# Patient Record
Sex: FEMALE | Race: WHITE | NOT HISPANIC OR LATINO | Employment: FULL TIME | URBAN - METROPOLITAN AREA
[De-identification: names, ages, dates, MRNs, and addresses within clinical notes are randomized per-mention and may not be internally consistent; named-entity substitution may affect disease eponyms.]

---

## 2017-02-03 ENCOUNTER — ALLSCRIPTS OFFICE VISIT (OUTPATIENT)
Dept: OTHER | Facility: OTHER | Age: 22
End: 2017-02-03

## 2017-03-02 ENCOUNTER — GENERIC CONVERSION - ENCOUNTER (OUTPATIENT)
Dept: OTHER | Facility: OTHER | Age: 22
End: 2017-03-02

## 2017-05-16 ENCOUNTER — ALLSCRIPTS OFFICE VISIT (OUTPATIENT)
Dept: OTHER | Facility: OTHER | Age: 22
End: 2017-05-16

## 2018-01-09 NOTE — RESULT NOTES
Message   Recorded as Task   Date: 03/02/2017 02:12 PM, Created By: Marylene Noon   Task Name: Follow Up   Assigned To: Kaitlyn Cazares   Regarding Patient: Steven Galan, Status: Active   Comment:    Marylene Noon - 82 TUB 8413 2:12 PM     TASK CREATED  Pt not happy with pills  Wants a change  Marylene Noon - 68 Mar 2017 2:12 PM     TASK IN PROGRESS   Marylene Noon - 40 Mar 2017 2:17 PM     TASK EDITED  Pt only on pill 2 weeks - she will try to stay on it at least 3 mos  She wanted a change cause she is moodier on this pill   Marylene Noon - 98 Mar 2017 2:17 PM     TASK COMPLETED   Becky Stuart - 05 Apr 2017 1:28 PM     TASK REACTIVATED   Becky Stuart - 05 Apr 2017 1:29 PM     TASK EDITED  Pt called w same complaint and wants to stop taking these pills  Pt is @ [phone]   Maggi Rosales - 05 Apr 2017 2:00 PM     TASK IN PROGRESS   Maggi Rosales - 05 Apr 2017 2:07 PM     TASK EDITED  Patient hates the pill she is currently on  She doesn't want to wait 3 months because she just doesn't like it at all  C/o mood swings, longer cycle, and headaches  She was previously on Generess Fe but wasn't crazy about it  It was better than her current one  She is willing to re visit the [facility] unless we want to try her on something totally new  Your thoughts? Maryam Crys - 05 Apr 2017 4:08 PM     TASK EDITED  Patient states that on the pill she is on now she feels more dickerson not like herself and she's noticing more headaches discussed with her trying a multiphasic pill  She will try Ortho Tri-Cyclen Lo  Prescription was sent        Plan  Encounter for birth control pills maintenance    · Ortho Tri-Cyclen Lo 0 18/0 215/0 25 MG-25 MCG Oral Tablet (Norgestim-Eth Estrad  Triphasic); Take 1 pill daily    Signatures   Electronically signed by : Sarah Hurt CNM;  Apr 5 2017  4:08PM EST                       (Author)

## 2018-01-13 VITALS
DIASTOLIC BLOOD PRESSURE: 76 MMHG | HEIGHT: 63 IN | BODY MASS INDEX: 25.34 KG/M2 | WEIGHT: 143 LBS | SYSTOLIC BLOOD PRESSURE: 122 MMHG

## 2018-01-13 VITALS
DIASTOLIC BLOOD PRESSURE: 80 MMHG | BODY MASS INDEX: 26.75 KG/M2 | HEIGHT: 63 IN | WEIGHT: 151 LBS | SYSTOLIC BLOOD PRESSURE: 112 MMHG

## 2018-01-14 NOTE — PROGRESS NOTES
Chief Complaint  Chief Complaint Free Text Note Form: Pt is here for [facility]  She is currently on Generic Generess Fe and she is happy with this  History of Present Illness  HPI: 27year-old with an LMP of 16 here today for a refill of her birth control pills  She's taking Generess and denies ACHES  She does use condoms when she is sexually active  She has no complaints today  Contraception (Brief): She is  0  The last menstrual period began 16  She is not pregnant  Current menstrual history includes regular menstrual cycles  She is sexually active  Patient goals include pregnancy prevention  The patient is currently asymptomatic  Vitals  Vital Signs [Data Includes: Current Encounter]    Recorded: 07RPM4004 65:07IT   Systolic 143   Diastolic 74   Height 5 ft 3 in   2-20 Stature Percentile 31 %   Weight 136 lb    2-20 Weight Percentile 66 %   BMI Calculated 24 09   BMI Percentile 75 %   BSA Calculated 1 64   LMP [id number]     Physical Exam    Pulmonary   Respiratory effort: No increased work of breathing or signs of respiratory distress  Auscultation of lungs: Clear to auscultation  Cardiovascular   Auscultation of heart: Normal rate and rhythm, normal S1 and S2, no murmurs  Psychiatric   Orientation to person, place, and time: Normal     Mood and affect: Normal        Assessment    1  Encounter for birth control pills maintenance (V25 41) (Z30 41)    Plan    1  Norethin-Eth Estradiol-Fe 0 8-25 MG-MCG Oral Tablet Chewable (Generess FE); Take 1 tablet daily    Discussion/Summary  Discussion Summary:   We'll refill the birth control pills for the patient  She declined STD testing at this time but advised  She changes her mind to let us know  Return to the office in 1 year or when necessary  Contraception: Impression: contraception management  Currently, the patient has satisfactory contraception  There are no changes in medication management  Treatment plan includes condom use  Patient discussion: 20 minute visit, greater than half of the time was spent on counseling        Future Appointments    Date/Time Provider Specialty Site   01/26/2017 10:00 AM Odilia Lr CNM Obstetrics/Gynecology [facility] [address] [facility]     Signatures   Electronically signed by : Sanford Hamilton CNM; Jan 25 2016  2:44PM EST                       (Author)    Electronically signed by : Sanford Hamilton CNM; Jan 25 2016  2:44PM EST                       (Author)    Electronically signed by : Umer Florez MD; Jan 26 2016  1:34PM EST Mixed hyperlipidemia

## 2018-01-15 NOTE — MISCELLANEOUS
Message   Recorded as Task   Date: 05/23/2016 02:18 PM, Created By: Glenny Gu   Task Name: Medical Complaint Callback   Assigned To: Imelda Saavedra   Regarding Patient: Maureen Chery, Status: In Progress   Comment:    Jerica Linton - 23 May 2016 2:18 PM     TASK CREATED  Caller: Self; [phone] (Home)  extremely dickerson, with anger, being on the generic of generess fe, wants brand necessary, please sent to the [facility] pharmacy on file in [place], to last till her yrly exam next yr  [address] - 23 May 2016 2:39 PM     TASK [address],[facility]  [address] - 23 May 2016 2:41 PM     TASK EDITED  Rx for brand generess to ehr        Active Problems    1  Encounter for birth control pills maintenance (V25 41) (Z30 41)    Current Meds   1  Norethin-Eth Estradiol-Fe 0 8-25 MG-MCG Oral Tablet Chewable (Generess FE); Take 1   tablet daily; Therapy: 07LLO7069 to (Evaluate:35Squ8544)  Requested for: 01JQI8264; Last   Rx:25Jan2016 Ordered    Plan  Encounter for birth control pills maintenance    · Generess FE 0 8-25 MG-MCG Oral Tablet Chewable (Norethin-Eth Estradiol-Fe);  sig 1 daily    Signatures   Electronically signed by :  Didier Mcintyre, ; May 23 2016  2:41PM EST                       (Author)

## 2018-01-16 NOTE — MISCELLANEOUS
Message   Recorded as Task   Date: 02/27/2017 01:58 PM, Created By: Alexis Greenberg   Task Name: Medical Complaint Callback   Assigned To: Evans Amanda   Regarding Patient: More Woodruff, Status: In Progress   Comment:    Ximena Dillard - 27 Feb 2017 1:58 PM     TASK CREATED  Caller: Self; Medical Complaint; [phone] (Day)  pt recently changed pills; she has been on the current one for a month; does not like it-feels extra dickerson  Had been told there were 2 options and she wants to try the other one now  she is @[phone]  Heladio Brown - 27 Feb 2017 3:37 PM     TASK EDITED  lmtcb   Heladio Brown - 27 Feb 2017 3:37 PM     TASK IN PROGRESS   Mellisa George - 28 Feb 2017 2:33 PM     TASK EDITED  pt returning call on prev task, pts # [phone]   [facility] - 28 Feb 2017 2:50 PM     TASK EDITED  lm for pt tcb   Heladio Brown - 44 Mar 2017 11:56 AM     TASK EDITED  As pt has not cb - will delete        Active Problems    1  Encounter for birth control pills maintenance (V25 41) (Z30 41)   2  Encounter for gynecological examination without abnormal finding (V72 31) (Z01 419)    Current Meds   1  Generess FE 0 8-25 MG-MCG Oral Tablet Chewable (Norethin-Eth Estradiol-Fe); CHEW   1 TABLET BY MOUTH DAILY; Therapy: 85SCK4016 to (Evaluate:16Oct2017)  Requested for: 33YIL1734; Last   Rx:83Rvp9830 Ordered   2  Loestrin Fe 1/20 1-20 MG-MCG Oral Tablet (Norethin Ace-Eth Estrad-FE); TAKE ONE (1)   TABLET daily; Therapy: 36BVP2530 to (Last Rx:15Ayy8108)  Requested for: 07Amf9519 Ordered   3  Norethin-Eth Estradiol-Fe 0 8-25 MG-MCG Oral Tablet Chewable (Generess FE); Take 1   tablet daily; Therapy: 28MOD5372 to (Evaluate:60Cmz0750)  Requested for: 92ZKW8508; Last   Rx:25Jan2016 Ordered    Signatures   Electronically signed by :  Marquis Mcintyre, ; Mar  2 2017 11:57AM EST                       (Author)

## 2018-02-23 ENCOUNTER — OFFICE VISIT (OUTPATIENT)
Dept: OBGYN CLINIC | Facility: MEDICAL CENTER | Age: 23
End: 2018-02-23
Payer: COMMERCIAL

## 2018-02-23 VITALS
WEIGHT: 156.4 LBS | BODY MASS INDEX: 27.71 KG/M2 | SYSTOLIC BLOOD PRESSURE: 120 MMHG | DIASTOLIC BLOOD PRESSURE: 80 MMHG | HEIGHT: 63 IN

## 2018-02-23 DIAGNOSIS — Z30.41 SURVEILLANCE OF CONTRACEPTIVE PILL: ICD-10-CM

## 2018-02-23 DIAGNOSIS — Z01.419 ENCOUNTER FOR ANNUAL ROUTINE GYNECOLOGICAL EXAMINATION: Primary | ICD-10-CM

## 2018-02-23 PROBLEM — F41.9 ANXIETY: Status: ACTIVE | Noted: 2018-02-23

## 2018-02-23 PROCEDURE — S0612 ANNUAL GYNECOLOGICAL EXAMINA: HCPCS | Performed by: OBSTETRICS & GYNECOLOGY

## 2018-02-23 PROCEDURE — 87491 CHLMYD TRACH DNA AMP PROBE: CPT | Performed by: OBSTETRICS & GYNECOLOGY

## 2018-02-23 PROCEDURE — 87591 N.GONORRHOEAE DNA AMP PROB: CPT | Performed by: OBSTETRICS & GYNECOLOGY

## 2018-02-23 PROCEDURE — G0145 SCR C/V CYTO,THINLAYER,RESCR: HCPCS | Performed by: OBSTETRICS & GYNECOLOGY

## 2018-02-23 PROCEDURE — 88141 CYTOPATH C/V INTERPRET: CPT | Performed by: PATHOLOGY

## 2018-02-23 RX ORDER — NORGESTIMATE AND ETHINYL ESTRADIOL
1 KIT DAILY
Refills: 12 | COMMUNITY
Start: 2018-02-09 | End: 2018-02-23

## 2018-02-23 RX ORDER — NORGESTIMATE AND ETHINYL ESTRADIOL 7DAYSX3 LO
1 KIT ORAL DAILY
Qty: 84 TABLET | Refills: 3 | Status: SHIPPED | OUTPATIENT
Start: 2018-02-23 | End: 2019-05-05 | Stop reason: SDUPTHER

## 2018-02-23 NOTE — PROGRESS NOTES
Assessment/Plan:    No problem-specific Assessment & Plan notes found for this encounter  Diagnoses and all orders for this visit:    Encounter for annual routine gynecological examination  -     Liquid-based pap, screening    Surveillance of contraceptive pill  -     norgestimate-ethinyl estradiol (ORTHO TRI-CYCLEN LO) 0 18/0 215/0 25 MG-25 MCG per tablet; Take 1 tablet by mouth daily    Other orders  -     Discontinue: TRI-LO-DARLENE 0 18/0 215/0 25 MG-25 MCG per tablet; Take 1 tablet by mouth daily        Subjective:      Patient ID: Jostin Gómez is a 26 y o  female  Working at [address] [place] [address] in [employer], going to school for business administration  Boyfriend x 1 5 years, agreeable to cultures  3 lifetime partner  Not using condoms consistently  On OCPS - would like brand refill, as finds she gets less headaches with brand name  Denies urinary complaints  No changes in her bowel habits  No breast concerns today  This will be her 1st gynecologic exam     Her & her  boyfriend are going to [place] next weekend for the annual horse show, she does ride horses  Gynecologic Exam   The patient's pertinent negatives include no missed menses, pelvic pain, vaginal bleeding or vaginal discharge  Pertinent negatives include no abdominal pain, constipation, diarrhea, dysuria, frequency or painful intercourse  She is sexually active  She uses condoms and oral contraceptives for contraception  Her menstrual history has been regular  The following portions of the patient's history were reviewed and updated as appropriate: allergies, current medications, past family history, past medical history, past social history, past surgical history and problem list     Review of Systems   Constitutional: Negative for activity change and unexpected weight change  Gastrointestinal: Negative for abdominal distention, abdominal pain, constipation and diarrhea     Genitourinary: Negative for dyspareunia, dysuria, frequency, menstrual problem, missed menses, pelvic pain, vaginal bleeding, vaginal discharge and vaginal pain  Objective:      /80   Ht 5' 2 5" (1 588 m)   Wt 70 9 kg (156 lb 6 4 oz)   LMP 02/07/2018   BMI 28 15 kg/m²          Physical Exam   Constitutional: She appears well-developed and well-nourished  No distress  Pulmonary/Chest: No respiratory distress  Abdominal: Soft  There is no tenderness  Genitourinary: Vagina normal and uterus normal  No breast swelling, tenderness or discharge  There is no rash on the right labia  There is no rash or lesion on the left labia  Uterus is not enlarged and not tender  Cervix exhibits no motion tenderness and no discharge  Right adnexum displays no mass and no tenderness  Left adnexum displays no mass and no tenderness  No vaginal discharge found  Musculoskeletal: She exhibits no edema  Neurological: She is alert  Skin: Skin is warm and dry  Psychiatric: She has a normal mood and affect  Vitals reviewed

## 2018-02-27 LAB
CHLAMYDIA DNA CVX QL NAA+PROBE: NORMAL
N GONORRHOEA DNA GENITAL QL NAA+PROBE: NORMAL

## 2018-03-02 LAB
LAB AP GYN PRIMARY INTERPRETATION: NORMAL
Lab: NORMAL
PATH INTERP SPEC-IMP: NORMAL

## 2018-03-09 ENCOUNTER — APPOINTMENT (OUTPATIENT)
Dept: LAB | Age: 23
End: 2018-03-09
Payer: COMMERCIAL

## 2018-03-09 ENCOUNTER — TRANSCRIBE ORDERS (OUTPATIENT)
Dept: ADMINISTRATIVE | Age: 23
End: 2018-03-09

## 2018-03-09 DIAGNOSIS — N30.00 ACUTE CYSTITIS WITHOUT HEMATURIA: ICD-10-CM

## 2018-03-09 DIAGNOSIS — N30.00 ACUTE CYSTITIS WITHOUT HEMATURIA: Primary | ICD-10-CM

## 2018-03-09 LAB
BACTERIA UR QL AUTO: ABNORMAL /HPF
BILIRUB UR QL STRIP: NEGATIVE
CLARITY UR: CLEAR
COLOR UR: YELLOW
GLUCOSE UR STRIP-MCNC: NEGATIVE MG/DL
HGB UR QL STRIP.AUTO: ABNORMAL
HYALINE CASTS #/AREA URNS LPF: ABNORMAL /LPF
KETONES UR STRIP-MCNC: NEGATIVE MG/DL
LEUKOCYTE ESTERASE UR QL STRIP: ABNORMAL
NITRITE UR QL STRIP: NEGATIVE
NON-SQ EPI CELLS URNS QL MICRO: ABNORMAL /HPF
PH UR STRIP.AUTO: 6 [PH] (ref 4.5–8)
PROT UR STRIP-MCNC: NEGATIVE MG/DL
RBC #/AREA URNS AUTO: ABNORMAL /HPF
SP GR UR STRIP.AUTO: 1.02 (ref 1–1.03)
UROBILINOGEN UR QL STRIP.AUTO: 0.2 E.U./DL
WBC #/AREA URNS AUTO: ABNORMAL /HPF

## 2018-03-09 PROCEDURE — 87186 SC STD MICRODIL/AGAR DIL: CPT

## 2018-03-09 PROCEDURE — 87086 URINE CULTURE/COLONY COUNT: CPT

## 2018-03-09 PROCEDURE — 81001 URINALYSIS AUTO W/SCOPE: CPT

## 2018-03-09 PROCEDURE — 87077 CULTURE AEROBIC IDENTIFY: CPT

## 2018-03-09 RX ORDER — SULFAMETHOXAZOLE AND TRIMETHOPRIM 800; 160 MG/1; MG/1
1 TABLET ORAL EVERY 12 HOURS SCHEDULED
Qty: 6 TABLET | Refills: 0 | Status: SHIPPED | OUTPATIENT
Start: 2018-03-09 | End: 2018-03-12

## 2018-03-09 NOTE — TELEPHONE ENCOUNTER
----- Message from Isabel Sam MD sent at 3/9/2018 11:42 AM EST -----  Can you please let Joan Houser know that I reviewed her pap - it was very mildly abnormal (ASCUS) - but because of her age we don't recommend any further testing right now  We will repeat cytology next year to ensure her body clears it  Her cultures were also negative  Thanks!

## 2018-03-09 NOTE — TELEPHONE ENCOUNTER
----- Message from Noemy Rodríguez MD sent at 3/9/2018 11:42 AM EST -----  Can you please let Nettie Alcala know that I reviewed her pap - it was very mildly abnormal (ASCUS) - but because of her age we don't recommend any further testing right now  We will repeat cytology next year to ensure her body clears it  Her cultures were also negative  Thanks!

## 2018-03-09 NOTE — TELEPHONE ENCOUNTER
Called pt- advised of pap results  Pt will call to schedule repeat pap in 1 year  -also, informed of neg Gc/chlamydia   Pt states, "I have frequency & hesitancy with urination " also, had pain last evening " UA & C& S ordered to [facility] lab  Bactrim ordered to pharmacy, per [facility] protocol  Pt advised to take urine to lab prior to starting antibiotic therapy

## 2018-03-11 LAB — BACTERIA UR CULT: ABNORMAL

## 2018-03-12 ENCOUNTER — TELEPHONE (OUTPATIENT)
Dept: OBGYN CLINIC | Facility: CLINIC | Age: 23
End: 2018-03-12

## 2018-03-12 DIAGNOSIS — R35.0 FREQUENT URINATION: Primary | ICD-10-CM

## 2018-03-12 RX ORDER — NITROFURANTOIN 25; 75 MG/1; MG/1
CAPSULE ORAL
Qty: 14 CAPSULE | Refills: 0 | Status: SHIPPED | OUTPATIENT
Start: 2018-03-12 | End: 2018-09-08 | Stop reason: ALTCHOICE

## 2018-03-12 NOTE — TELEPHONE ENCOUNTER
Spoke with pt - she is aware of urine results  She took the bactrim, but only for a day 1/2  Last pill was taken Saturday  It made her sick - made her throw up  Inquired if had the stomach virus - she stated she was fine before taking the Bactrim  She wants to know if there is something else she can take  Please advise  Thanks!

## 2018-03-12 NOTE — TELEPHONE ENCOUNTER
----- Message from Sylvester Esparza MD sent at 3/11/2018  6:03 PM EDT -----  Can you let [place] know that she does in fact have a UTI - the bactrim she took should have cleared this    Thanks

## 2018-03-14 ENCOUNTER — TELEPHONE (OUTPATIENT)
Dept: OBGYN CLINIC | Facility: CLINIC | Age: 23
End: 2018-03-14

## 2018-03-14 NOTE — TELEPHONE ENCOUNTER
Called pt -L/M on voicemail asking that pt call office just to confirm she is feeling better & symptoms have subsided since taking the bactrim

## 2018-03-14 NOTE — TELEPHONE ENCOUNTER
Pt returned call- states" bactrim made her feel nauseous " she d/c'd it & macrobid was ordered  She is feeling much better & tolerating  macrobid without any difficulty  Pt encouraged to increase her water intake while taking medication, & to take all med, as prescribed  Pt verbalized understanding

## 2018-09-08 ENCOUNTER — OFFICE VISIT (OUTPATIENT)
Dept: URGENT CARE | Facility: MEDICAL CENTER | Age: 23
End: 2018-09-08
Payer: COMMERCIAL

## 2018-09-08 VITALS
HEART RATE: 102 BPM | BODY MASS INDEX: 30.91 KG/M2 | HEIGHT: 62 IN | SYSTOLIC BLOOD PRESSURE: 138 MMHG | OXYGEN SATURATION: 100 % | WEIGHT: 168 LBS | TEMPERATURE: 98.5 F | RESPIRATION RATE: 20 BRPM | DIASTOLIC BLOOD PRESSURE: 88 MMHG

## 2018-09-08 DIAGNOSIS — S06.0X0A CONCUSSION WITHOUT LOSS OF CONSCIOUSNESS, INITIAL ENCOUNTER: Primary | ICD-10-CM

## 2018-09-08 PROCEDURE — 99213 OFFICE O/P EST LOW 20 MIN: CPT | Performed by: PHYSICIAN ASSISTANT

## 2018-09-08 RX ORDER — VENLAFAXINE HYDROCHLORIDE 150 MG/1
150 CAPSULE, EXTENDED RELEASE ORAL DAILY
Refills: 5 | COMMUNITY
Start: 2018-06-07

## 2018-09-08 RX ORDER — PANTOPRAZOLE SODIUM 40 MG/1
40 TABLET, DELAYED RELEASE ORAL DAILY
COMMUNITY
End: 2020-07-02 | Stop reason: HOSPADM

## 2018-09-08 NOTE — PROGRESS NOTES
Pt fell off of her horse and hit the back of her head on the ground  Pt was wearing a helmet  Pt denies losing consciousness  Oriented x 3  Pupils dilated

## 2018-09-08 NOTE — LETTER
September 8, 2018     Patient: Nolan Donahue   YOB: 1995   Date of Visit: 9/8/2018       To Whom it May Concern:    Nolan Donahue was seen in my clinic on 9/8/2018  She may return to school on 9/10/2018  Please allow for additional time for assignments and tests until symptom free  If you have any questions or concerns, please don't hesitate to call           Sincerely,          Brittney Cantrell PA-C        CC: No Recipients

## 2018-09-08 NOTE — PROGRESS NOTES
[facility]        NAME: Rashad Haider is a 26 y o  female  : 1995    MRN: [id number]  DATE: 2018  TIME: 6:41 PM    Assessment and Plan   Concussion without loss of consciousness, initial encounter [S06 0X0A]  1  Concussion without loss of consciousness, initial encounter           Patient Instructions     1  Tylenol or Motrin as needed for headache  2  Activities as tolerated until symptom free  3  Follow up with PCP in 3-5 days if symptoms persist      Chief Complaint     Chief Complaint   Patient presents with    Head Injury         History of Present Illness       The patient is a 29-year-old female presents for evaluation of a head injury after a fall from horse earlier today  She states she was riding a horse when she was thrown off the back, striking the back of her neck and head off the ground  Patient denies any loss of consciousness or post injury vomiting  She does report headache, dizziness and slight disorientation  Patient also complains of some bruising of her left thumb and tightness in the back of her upper back and shoulder region  Review of Systems   Review of Systems   Constitutional: Negative  Musculoskeletal: Positive for neck pain  Neurological: Positive for dizziness, light-headedness and headaches  Negative for speech difficulty, weakness and numbness           Current Medications       Current Outpatient Prescriptions:     norgestimate-ethinyl estradiol (ORTHO TRI-CYCLEN LO) 0 18/0 215/0 25 MG-25 MCG per tablet, Take 1 tablet by mouth daily, Disp: 84 tablet, Rfl: 3    pantoprazole (PROTONIX) 40 mg tablet, Take 40 mg by mouth daily, Disp: , Rfl:     venlafaxine (EFFEXOR-XR) 150 mg 24 hr capsule, Take 150 mg by mouth daily, Disp: , Rfl: 5    Current Allergies     Allergies as of 2018    (No Known Allergies)            The following portions of the patient's history were reviewed and updated as appropriate: allergies, current medications, past family history, past medical history, past social history, past surgical history and problem list      History reviewed  No pertinent past medical history  Past Surgical History:   Procedure Laterality Date    FOOT TENDON SURGERY Bilateral 2012    Repair of flat feet       Family History   Problem Relation Age of Onset    No Known Problems Mother     No Known Problems Father          Medications have been verified  Objective   /88   Pulse 102   Temp 98 5 °F (36 9 °C) (Temporal)   Resp 20   Ht 5' 2" (1 575 m)   Wt 76 2 kg (168 lb)   SpO2 100%   BMI 30 73 kg/m²        Physical Exam     Physical Exam   Constitutional: She is oriented to person, place, and time  She appears well-developed and well-nourished  No distress  HENT:   Head: Normocephalic and atraumatic  Right Ear: Tympanic membrane normal    Left Ear: Tympanic membrane normal    Nose: Nose normal    Mouth/Throat: Uvula is midline, oropharynx is clear and moist and mucous membranes are normal    Eyes: Conjunctivae and EOM are normal  Pupils are equal, round, and reactive to light  Cardiovascular: Normal rate, regular rhythm and normal heart sounds  No murmur heard  Pulmonary/Chest: Effort normal and breath sounds normal    Musculoskeletal:        Back:         Hands:  Neurological: She is alert and oriented to person, place, and time  She has normal strength  No cranial nerve deficit or sensory deficit  Coordination abnormal  Gait normal    Reflex Scores:       Tricep reflexes are 2+ on the right side and 2+ on the left side  Bicep reflexes are 2+ on the right side and 2+ on the left side         Patellar reflexes are 2+ on the right side and 2+ on the left side   + Romberg

## 2018-09-08 NOTE — PATIENT INSTRUCTIONS
1  Tylenol or Motrin as needed for headache  2  Activities as tolerated until symptom free  3   Follow up with PCP in 3-5 days if symptoms persist

## 2018-09-12 ENCOUNTER — APPOINTMENT (EMERGENCY)
Dept: CT IMAGING | Facility: HOSPITAL | Age: 23
End: 2018-09-12
Payer: COMMERCIAL

## 2018-09-12 ENCOUNTER — HOSPITAL ENCOUNTER (EMERGENCY)
Facility: HOSPITAL | Age: 23
Discharge: HOME | End: 2018-09-12
Attending: EMERGENCY MEDICINE
Payer: COMMERCIAL

## 2018-09-12 VITALS
RESPIRATION RATE: 18 BRPM | OXYGEN SATURATION: 97 % | HEART RATE: 77 BPM | SYSTOLIC BLOOD PRESSURE: 133 MMHG | DIASTOLIC BLOOD PRESSURE: 79 MMHG | WEIGHT: 165 LBS | BODY MASS INDEX: 30.36 KG/M2 | TEMPERATURE: 98.4 F | HEIGHT: 62 IN

## 2018-09-12 DIAGNOSIS — R51.9 HEADACHE: ICD-10-CM

## 2018-09-12 DIAGNOSIS — R11.0 NAUSEA: ICD-10-CM

## 2018-09-12 DIAGNOSIS — S06.0X9A CONCUSSION: Primary | ICD-10-CM

## 2018-09-12 LAB — EXT PREG TEST URINE: NEGATIVE

## 2018-09-12 PROCEDURE — 99284 EMERGENCY DEPT VISIT MOD MDM: CPT

## 2018-09-12 PROCEDURE — 70450 CT HEAD/BRAIN W/O DYE: CPT

## 2018-09-12 PROCEDURE — 81025 URINE PREGNANCY TEST: CPT | Performed by: PHYSICIAN ASSISTANT

## 2018-09-12 RX ORDER — ONDANSETRON 4 MG/1
4 TABLET, FILM COATED ORAL EVERY 8 HOURS PRN
Qty: 20 TABLET | Refills: 0 | Status: SHIPPED | OUTPATIENT
Start: 2018-09-12 | End: 2020-06-23

## 2018-09-12 RX ORDER — ACETAMINOPHEN, ASPIRIN AND CAFFEINE 250; 250; 65 MG/1; MG/1; MG/1
1 TABLET, FILM COATED ORAL EVERY 6 HOURS PRN
Qty: 30 TABLET | Refills: 0 | Status: SHIPPED | OUTPATIENT
Start: 2018-09-12 | End: 2020-06-23

## 2018-09-12 NOTE — ED PROVIDER NOTES
History  Chief Complaint   Patient presents with    Fall     Pt presents for re-evaluation  Pt fell off horse several days ago and told she has a concussion  Pt states she has little to no relief from original symptoms  Es Greene is a 26 y o  female presents to the ED with complaints of intermittent frontal HA, intermittent nausea and b/l lateral neck pain x 2 days  Patient fell off a horse on Saturday and was evaluated at Urgent Care on Saturday night  Patient denies LOC  Patient states intermittent HA began a few hours after the accidents and the neck pain began upon awakening on Sunday  Patient states she began feeling intermittent nausea with eating on Monday, denies vomiting  Patient states she has been having intermittent photophobia to fluorescent lights at school  Patient has been able to concentrate at school and has been seen by PCP since incident  Patient has been taking 2 (200mg) ibuprofen with relief of HA for 4-6 hours  Patient states she has only taken ibuprofen 1-2 times since accident  Denies amnesia, confusion, LOC, current blood thinners, seizure, increased lethargy, blurred vision, chest pain, SOB  History provided by:  Patient and caregiver  Fall   Mechanism of injury: fall    Injury location:  Head/neck  Head/neck injury location:  Head  [place]    Time since incident:  4 days  Fall:     Fall occurred:  From an animal    Height of fall:  4 feet    Impact surface:  Dirt    Point of impact:  Head  Protective equipment: helmet    Suspicion of alcohol use: no    Suspicion of drug use: no    Prior to arrival data:     Bystander interventions:  None    Blood loss:  None    Responsiveness at scene:  Alert    Orientation at scene:  Person, place, situation and time    Loss of consciousness: no      Amnesic to event: no    Associated symptoms: headaches, nausea and neck pain    Associated symptoms: no abdominal pain, no back pain, no blindness, no chest pain, no difficulty breathing, no hearing loss, no loss of consciousness, no seizures and no vomiting    Headaches:     Severity:  Mild    Onset quality:  Gradual    Duration:  3 days    Timing:  Intermittent    Progression:  Unchanged    Chronicity:  New      Prior to Admission Medications   Prescriptions Last Dose Informant Patient Reported? Taking?   norgestimate-ethinyl estradiol (ORTHO TRI-CYCLEN LO) 0 18/0 215/0 25 MG-25 MCG per tablet   No No   Sig: Take 1 tablet by mouth daily   pantoprazole (PROTONIX) 40 mg tablet   Yes No   Sig: Take 40 mg by mouth daily   venlafaxine (EFFEXOR-XR) 150 mg 24 hr capsule   Yes No   Sig: Take 150 mg by mouth daily      Facility-Administered Medications: None       History reviewed  No pertinent past medical history  Past Surgical History:   Procedure Laterality Date    FOOT TENDON SURGERY Bilateral 2012    Repair of flat feet       Family History   Problem Relation Age of Onset    No Known Problems Mother     No Known Problems Father      I have reviewed and agree with the history as documented  Social History   Substance Use Topics    Smoking status: Never Smoker    Smokeless tobacco: Never Used    Alcohol use Yes        Review of Systems   Constitutional: Negative for appetite change, chills, fever and unexpected weight change  HENT: Negative for congestion, drooling, ear pain, hearing loss, rhinorrhea, sore throat, trouble swallowing and voice change  Eyes: Negative for blindness, pain, discharge, redness and visual disturbance  Respiratory: Negative for cough, shortness of breath, wheezing and stridor  Cardiovascular: Negative for chest pain, palpitations and leg swelling  Gastrointestinal: Positive for nausea  Negative for abdominal pain, blood in stool, constipation, diarrhea and vomiting  Genitourinary: Negative for dysuria, flank pain, frequency, hematuria and urgency  Musculoskeletal: Positive for myalgias and neck pain   Negative for arthralgias, back pain, gait problem, joint swelling and neck stiffness  Skin: Negative for color change and rash  Neurological: Positive for headaches  Negative for dizziness, tremors, seizures, loss of consciousness, syncope, facial asymmetry, speech difficulty, weakness, light-headedness and numbness  Physical Exam  Physical Exam   Constitutional: She is oriented to person, place, and time  She appears well-developed and well-nourished  No distress  HENT:   Head: Normocephalic and atraumatic  Right Ear: External ear normal    Left Ear: External ear normal    Nose: Nose normal    Mouth/Throat: Oropharynx is clear and moist    Eyes: Conjunctivae and EOM are normal  Pupils are equal, round, and reactive to light  Neck: Trachea normal, normal range of motion and full passive range of motion without pain  Neck supple  No spinous process tenderness and no muscular tenderness present  No edema, no erythema and normal range of motion present  No Brudzinski's sign and no Kernig's sign noted  Cardiovascular: Normal rate and regular rhythm  Pulmonary/Chest: Effort normal and breath sounds normal  No respiratory distress  She has no wheezes  She has no rales  She exhibits no tenderness  Abdominal: Soft  Bowel sounds are normal  She exhibits no distension  There is no tenderness  There is no guarding  Musculoskeletal: Normal range of motion  Neurological: She is alert and oriented to person, place, and time  She has normal strength and normal reflexes  No cranial nerve deficit or sensory deficit  She displays a negative Romberg sign  GCS eye subscore is 4  GCS verbal subscore is 5  GCS motor subscore is 6     Alert and oriented to person, place, and time  PERRL and 3 mm symmetrical, EOMI with no evidence of nystagmus  Visual fields were intact to confrontation  Visual pursuits were smooth with normal saccadic eye movements  Facial sensation intact b/l with no evidence of facial asymmetry  Hearing was normal b/l and the tongue and palate were in midline  SCM and upper trapezius strength normal b/l  Normal muscle tone, muscle strength testing revealed 5/5 for both upper and lower extremities   DTRs were 2+ both upper and lower, plantar reflex was flexor b/l   No evidence of postural or action tremor, No dysmetria seen on FTN testing   No evidence of sensory deficits    Skin: Skin is warm and dry  Capillary refill takes less than 2 seconds  Psychiatric: She has a normal mood and affect  Nursing note and vitals reviewed  Vital Signs  ED Triage Vitals [09/12/18 1439]   Temperature Pulse Respirations Blood Pressure SpO2   98 4 °F (36 9 °C) 77 18 133/79 97 %      Temp Source Heart Rate Source Patient Position - Orthostatic VS BP Location FiO2 (%)   Oral Monitor Sitting Right arm --      Pain Score       5           Vitals:    09/12/18 1439   BP: 133/79   Pulse: 77   Patient Position - Orthostatic VS: Sitting       Visual Acuity  Visual Acuity      Most Recent Value   L Pupil Size (mm)  4   R Pupil Size (mm)  4          ED Medications  Medications - No data to display    Diagnostic Studies  Results Reviewed     Procedure Component Value Units Date/Time    POCT pregnancy, urine [[id number]]  (Normal) Resulted:  09/12/18 1556    Lab Status:  Final result Updated:  09/12/18 1556     EXT PREG TEST UR (Ref: Negative) NEGATIVE                 CT head without contrast   Final Result by Milton Mullen MD (09/12 1638)      No acute intracranial abnormality  Workstation performed: [id number]                    Procedures  Procedures       Phone Contacts  ED Phone Contact    ED Course  ED Course as of Sep 12 1952   Wed Sep 12, 2018   1540 Mother would like CT Head at this time given VIVIENNE  Educated mother regarding risks of CT and evaluation of normal neurological exam, will obtain CT at this time  1652 Reviewed findings with patient and mother   Educated mother and patient on concussive symptoms and will advise close follow up with PCP/sports medicine  MDM  Number of Diagnoses or Management Options  Diagnosis management comments: Differential diagnosis included but not limited to: concussion, head injury, headache, migraine    [place] CT Rule = 0 Unnecessary for CT Head at this time    Educated mother and patient regarding concussion protocol     CritCare Time    Disposition  Final diagnoses:   Headache   Nausea   Concussion     Time reflects when diagnosis was documented in both MDM as applicable and the Disposition within this note     Time User Action Codes Description Comment    9/12/2018  3:31 PM Kade Elton Add [R51] Headache     9/12/2018  3:31 PM Luvenia Clas [R11 0] Nausea     9/12/2018  3:31 PM Kade Elton Add [S06 0X9A] Concussion     9/12/2018  3:31 PM Kade Elton Modify [R51] Headache     9/12/2018  3:31  [address] [S06 0X9A] Concussion       ED Disposition     ED Disposition Condition Comment    Discharge  Patient is being discharged home in good condition  Follow-up Information     Follow up With Specialties Details Why [address], DO Family Medicine Go to As needed [address] [facility] [address]  [phone]       [address] Sports Medicine  Schedule an appointment as soon as possible for a visit As needed [address] [facility] [address]  [phone] [facility], [address] [place], [place] [address]donal[facility] , [place], [place], [address]          Discharge Medication List as of 9/12/2018  5:03 PM      START taking these medications    Details   aspirin-acetaminophen-caffeine (EXCEDRIN MIGRAINE) 250-250-65 MG per tablet Take 1 tablet by mouth every 6 (six) hours as needed for headaches, Starting Wed 9/12/2018, Print      ondansetron (ZOFRAN) 4 mg tablet Take 1 tablet (4 mg total) by mouth every 8 (eight) hours as needed for nausea or vomiting, Starting Wed 9/12/2018, Print CONTINUE these medications which have NOT CHANGED    Details   norgestimate-ethinyl estradiol (ORTHO TRI-CYCLEN LO) 0 18/0 215/0 25 MG-25 MCG per tablet Take 1 tablet by mouth daily, Starting Fri 2/23/2018, Normal      pantoprazole (PROTONIX) 40 mg tablet Take 40 mg by mouth daily, Historical Med      venlafaxine (EFFEXOR-XR) 150 mg 24 hr capsule Take 150 mg by mouth daily, Starting Thu 6/7/2018, Historical Med           No discharge procedures on file      ED Provider  Electronically Signed by           Sidney Mora PA-C  09/12/18 1401 [address]  09/12/18 1621

## 2018-09-12 NOTE — DISCHARGE INSTRUCTIONS
Concussion   WHAT YOU NEED TO KNOW:   A concussion is a mild brain injury  It is usually caused by a bump or blow to the head from a fall, a motor vehicle crash, or a sports injury  Sometimes being shaken forcefully may cause a concussion  DISCHARGE INSTRUCTIONS:   Have someone else call 911 for the following:   · Someone tries to wake you and cannot do so  · You have a seizure, increasing confusion, or a change in personality  · Your speech becomes slurred, or you have new vision problems  Return to the emergency department if:   · You have a severe headache that does not go away  ·   ·   · You have arm or leg weakness, numbness, or new problems with coordination  · You have blood or clear fluid coming out of the ears or nose  Contact your healthcare provider if:   · You have nausea or are vomiting  · You feel more sleepy than usual     · Your symptoms get worse  · Your symptoms last longer than 6 weeks after the injury  · You have questions or concerns about your condition or care  Medicines:   · Acetaminophen  helps to decrease pain  It is available without a doctor's order  Ask how much to take and how often to take it  Follow directions  Acetaminophen can cause liver damage if not taken correctly  · NSAIDs , such as ibuprofen, help decrease swelling and pain  NSAIDs can cause stomach bleeding or kidney problems in certain people  If you take blood thinner medicine, always ask your healthcare provider if NSAIDs are safe for you  Always read the medicine label and follow directions  · Take your medicine as directed  Contact your healthcare provider if you think your medicine is not helping or if you have side effects  Tell him or her if you are allergic to any medicine  Keep a list of the medicines, vitamins, and herbs you take  Include the amounts, and when and why you take them  Bring the list or the pill bottles to follow-up visits   Carry your medicine list with you in case of an emergency  Follow up with your healthcare provider as directed:  Write down your questions so you remember to ask them during your visits  Self-care:   · Rest  from physical and mental activities as directed  Mental activities are those that require thinking, concentration, and attention  You will need to rest until your symptoms are gone  Rest will allow you to recover from your concussion  Ask your healthcare provider when you can return to work and other daily activities  · Have someone stay with you for the first 24 hours after your injury  Your healthcare provider should be contacted if your symptoms get worse, or you develop new symptoms  · Do not participate in sports and physical activities until your healthcare provider says it is okay  They could make your symptoms worse or lead to another concussion  Your healthcare provider will tell you when it is okay for you to return to sports or physical activities  Prevent another concussion:   · Wear protective sports equipment that fit properly  Helmets help decrease your risk of a serious brain injury  Talk to your healthcare provider about ways you can decrease your risk for a concussion if you play sports  · Wear your seat belt  every time you travel  This helps to decrease your risk of a head injury if you are in a car accident  © 2017 [facility] Information is for End User's use only and may not be sold, redistributed or otherwise used for commercial purposes  All illustrations and images included in CareNotes® are the copyrighted property of [employer]  or [employer]  The above information is an  only  It is not intended as medical advice for individual conditions or treatments  Talk to your doctor, nurse or pharmacist before following any medical regimen to see if it is safe and effective for you        Post Concussion Syndrome   WHAT YOU NEED TO KNOW:   Post-concussion syndrome (PCS) is a group of symptoms that affect your nerves, thinking, and behavior  PCS develops shortly after a concussion and can last for weeks to months  DISCHARGE INSTRUCTIONS:   Call 911 or have someone else call for any of the following: You have a seizure  You have trouble breathing  You are not responding or you cannot be woken  Return to the emergency department if:   You have a sudden headache that seems different or much worse than your usual headaches  You cannot stop vomiting  You have sudden changes in your vision  Contact your healthcare provider if:   You have nausea or are vomiting  You have trouble concentrating  You have difficulty speaking or thinking  Your symptoms get worse  You have questions or concerns about your condition or care  Medicines: You may  need any of the following:  Acetaminophen  decreases pain  It is available without a doctor's order  Ask how much to take and how often to take it  Follow directions  Acetaminophen can cause liver damage if not taken correctly  NSAIDs,  such as ibuprofen, help decrease swelling, pain, and fever  This medicine is available without a doctor's order  Follow directions  NSAIDs can cause stomach bleeding or kidney problems if not taken correctly  If you take blood thinner medicine, always ask if NSAIDs are safe for you  Antidepressants  may be given for depression or sleep problems  Migraine medicines  may be given for migraine headaches  NSAIDs , such as ibuprofen, help decrease swelling, pain, and fever  This medicine is available with or without a doctor's order  NSAIDs can cause stomach bleeding or kidney problems in certain people  If you take blood thinner medicine, always ask if NSAIDs are safe for you  Always read the medicine label and follow directions  Do not give these medicines to children under 10months of age without direction from your child's healthcare provider    Follow up with your healthcare provider as directed: Your healthcare provider may refer you to psychiatrist, a neurologist, or a substance abuse counselor  Write down your questions so you remember to ask them during your visits  Prevent PCS:   Make your home safe  Home safety measures can help prevent head injuries that could lead to a concussion  Install handrails for every staircase  Put soft bumpers on furniture edges and corners  Secure furniture, such as dressers and book cases so they do not fall over  Always wear a seatbelt in the car  This helps decrease your risk for a head injury if you are in a car accident  Wear protective sports equipment that fits properly  Helmets help decrease your risk for a serious brain injury  Talk to your healthcare provider about other ways that you can decrease your risk for a concussion if you play sports  Manage your symptoms:   Rest  from physical and mental activities as directed  Mental activities need you to think, concentrate, and pay attention  Rest will help you recover from your concussion  Ask your healthcare provider when you can return to school and other daily activities  Go to therapy  as directed  A cognitive behavioral therapist teaches you skills to help with any thinking and behavior problems you may have  An occupational therapist teaches your skills to help with daily activities  Do not participate in sports or physical activities  until your healthcare provider says it is okay  These activities could make your symptoms worse or lead to another concussion  Your healthcare provider will tell you when it is okay to return to sports or physical activities  © 2017 [address] [employer]  Information is for End User's use only and may not be sold, redistributed or otherwise used for commercial purposes  All illustrations and images included in CareNotes® are the copyrighted property of [employer]  or [employer]    The above information is an  only  It is not intended as medical advice for individual conditions or treatments  Talk to your doctor, nurse or pharmacist before following any medical regimen to see if it is safe and effective for you

## 2018-09-12 NOTE — ED NOTES
PT awake and alert, no distress noted  No other questions upon d/c       April Fidel Galvez RN  09/12/18 9689

## 2019-01-09 ENCOUNTER — TELEPHONE (OUTPATIENT)
Dept: OBGYN CLINIC | Facility: MEDICAL CENTER | Age: 24
End: 2019-01-09

## 2019-01-09 DIAGNOSIS — Z30.41 ENCOUNTER FOR SURVEILLANCE OF CONTRACEPTIVE PILLS: Primary | ICD-10-CM

## 2019-01-09 NOTE — TELEPHONE ENCOUNTER
Patient must be on brand name of her prescription but it is on back order long term and she would like to speak to someone about another prescription

## 2019-01-10 RX ORDER — NORGESTIMATE AND ETHINYL ESTRADIOL
1 KIT DAILY
Qty: 90 TABLET | Refills: 0 | Status: SHIPPED | OUTPATIENT
Start: 2019-01-10 | End: 2019-05-06 | Stop reason: SDUPTHER

## 2019-01-10 NOTE — TELEPHONE ENCOUNTER
Spoke with pt - she is taking Ortho Tri-Cyclen Lo - she loved it - but it is on back order at her pharmacy  [facility] tried [facility] [place]  [facility] [place], also her [place]  All on back order  Patient would like to have a replacement that is the same as her current OCP dose  She can only have name brands due to the generics giving her headaches  She would like to have a smooth transition into the new OCP  Please advise  Thanks!

## 2019-01-18 ENCOUNTER — TELEPHONE (OUTPATIENT)
Dept: OBGYN CLINIC | Facility: MEDICAL CENTER | Age: 24
End: 2019-01-18

## 2019-01-18 NOTE — TELEPHONE ENCOUNTER
Pt went to the pharmacy and she was told that [employer] is also on back order  She would like a different one sent in

## 2019-05-05 DIAGNOSIS — Z30.41 SURVEILLANCE OF CONTRACEPTIVE PILL: ICD-10-CM

## 2019-05-06 DIAGNOSIS — Z30.41 ENCOUNTER FOR SURVEILLANCE OF CONTRACEPTIVE PILLS: ICD-10-CM

## 2019-05-06 RX ORDER — NORGESTIMATE AND ETHINYL ESTRADIOL 7DAYSX3 LO
KIT ORAL
Qty: 84 TABLET | Refills: 0 | Status: SHIPPED | OUTPATIENT
Start: 2019-05-06 | End: 2019-07-09 | Stop reason: SDUPTHER

## 2019-05-07 RX ORDER — NORGESTIMATE AND ETHINYL ESTRADIOL
1 KIT DAILY
Qty: 90 TABLET | Refills: 1 | Status: SHIPPED | OUTPATIENT
Start: 2019-05-07 | End: 2020-07-02 | Stop reason: ALTCHOICE

## 2019-07-09 DIAGNOSIS — Z30.41 SURVEILLANCE OF CONTRACEPTIVE PILL: ICD-10-CM

## 2019-07-09 RX ORDER — NORGESTIMATE AND ETHINYL ESTRADIOL 7DAYSX3 LO
KIT ORAL
Qty: 84 TABLET | Refills: 0 | Status: SHIPPED | OUTPATIENT
Start: 2019-07-09 | End: 2020-06-23

## 2020-03-19 ENCOUNTER — TELEPHONE (OUTPATIENT)
Dept: OBGYN CLINIC | Facility: CLINIC | Age: 25
End: 2020-03-19

## 2020-06-22 RX ORDER — TRAMADOL HYDROCHLORIDE 50 MG/1
50 TABLET ORAL EVERY 6 HOURS PRN
COMMUNITY
Start: 2019-12-17 | End: 2020-12-16

## 2020-06-22 RX ORDER — DROSPIRENONE AND ETHINYL ESTRADIOL 0.03MG-3MG
1 KIT ORAL DAILY
COMMUNITY
Start: 2020-02-05 | End: 2021-06-19

## 2020-06-22 RX ORDER — PANTOPRAZOLE SODIUM 40 MG/1
40 TABLET, DELAYED RELEASE ORAL DAILY
COMMUNITY
Start: 2019-12-05 | End: 2020-06-23

## 2020-06-23 ENCOUNTER — OFFICE VISIT (OUTPATIENT)
Dept: GASTROENTEROLOGY | Facility: CLINIC | Age: 25
End: 2020-06-23
Payer: COMMERCIAL

## 2020-06-23 VITALS
SYSTOLIC BLOOD PRESSURE: 116 MMHG | BODY MASS INDEX: 26.76 KG/M2 | HEART RATE: 76 BPM | TEMPERATURE: 98.3 F | HEIGHT: 62 IN | DIASTOLIC BLOOD PRESSURE: 72 MMHG | WEIGHT: 145.4 LBS

## 2020-06-23 DIAGNOSIS — Z20.822 ENCOUNTER FOR LABORATORY TESTING FOR COVID-19 VIRUS: ICD-10-CM

## 2020-06-23 DIAGNOSIS — K21.9 GASTROESOPHAGEAL REFLUX DISEASE WITHOUT ESOPHAGITIS: Primary | ICD-10-CM

## 2020-06-23 PROCEDURE — 99244 OFF/OP CNSLTJ NEW/EST MOD 40: CPT | Performed by: INTERNAL MEDICINE

## 2020-06-23 RX ORDER — CLONAZEPAM 0.5 MG/1
0.5 TABLET ORAL 2 TIMES DAILY PRN
COMMUNITY

## 2020-06-23 NOTE — H&P (VIEW-ONLY)
Consultation - [facility]) [facility]  Lili Gandhi 1995 23 y o  female     ASSESSMENT @ PLAN:   She is a 73-year-old female with gastroesophageal reflux disease with 2 years on pantoprazole who is unable to come off of it due to pain nausea regurgitation that is severe  1 do EGD to investigate    2 her for endoscopy is normal and she does not have a large hiatal hernia we will try to use Pepcid 40 mg p o  b i d  for 2 weeks followed by 20 mg p o  b i d  for 2 weeks followed by 20 mg daily    Chief Complaint:   GERD    HPI:   She is a 73-year-old female with gastroesophageal reflux disease who was unable to come off for pantoprazole  If she skips a day she will have severe epigastric abdominal pain nausea regurgitation and what she calls churning in her stomach  She denies globus or dysphagia she denies melena hematochezia or vomiting  She does record hiccups and regurgitation even on the pill at times  She has never had endoscopy  She does not take NSAIDs  She does have anxiety  Her only food trigger is coffee  She does not have nighttime reflux  She did not have excessive burping or belching  Her BMI is 26  She does not have excessive central adiposity  REVIEW OF SYSTEMS:     CONSTITUTIONAL: Denies any fever, chills, or rigors  Good appetite, and no recent weight loss  HEENT: No earache or tinnitus  Denies hearing loss or visual disturbances  CARDIOVASCULAR: No chest pain or palpitations  RESPIRATORY: Denies any cough, hemoptysis, shortness of breath or dyspnea on exertion  GASTROINTESTINAL: As noted in the History of Present Illness  GENITOURINARY: No problems with urination  Denies any hematuria or dysuria  NEUROLOGIC: No dizziness or vertigo, denies headaches  MUSCULOSKELETAL: Denies any muscle or joint pain  SKIN: Denies skin rashes or itching  ENDOCRINE: Denies excessive thirst  Denies intolerance to heat or cold  PSYCHOSOCIAL: Denies depression or anxiety  Denies any recent memory loss  History reviewed  No pertinent past medical history  Past Surgical History:   Procedure Laterality Date    FOOT TENDON SURGERY Bilateral 2012    Repair of flat feet     Social History     Socioeconomic History    Marital status: Single     Spouse name: Not on file    Number of children: Not on file    Years of education: Not on file    Highest education level: Not on file   Occupational History    Not on file   Social Needs    Financial resource strain: Not on file    Food insecurity:     Worry: Not on file     Inability: Not on file    Transportation needs:     Medical: Not on file     Non-medical: Not on file   Tobacco Use    Smoking status: Never Smoker    Smokeless tobacco: Never Used   Substance and Sexual Activity    Alcohol use: Yes    Drug use: No    Sexual activity: Yes     Partners: Male     Comment:  boyfriend    Lifestyle    Physical activity:     Days per week: Not on file     Minutes per session: Not on file    Stress: Not on file   Relationships    Social connections:     Talks on phone: Not on file     Gets together: Not on file     Attends Worship service: Not on file     Active member of club or organization: Not on file     Attends meetings of clubs or organizations: Not on file     Relationship status: Not on file    Intimate partner violence:     Fear of current or ex partner: Not on file     Emotionally abused: Not on file     Physically abused: Not on file     Forced sexual activity: Not on file   Other Topics Concern    Not on file   Social History Narrative    Not on file     Family History   Problem Relation Age of Onset    No Known Problems Mother     No Known Problems Father      Patient has no known allergies    Current Outpatient Medications   Medication Sig Dispense Refill    clonazePAM (KlonoPIN) 0 5 mg tablet Take 0 5 mg by mouth 2 (two) times a day as needed for seizures      drospirenone-ethinyl estradiol (Alveria Bertha) 3-0 03 MG per tablet Take 1 tablet by mouth daily      pantoprazole (PROTONIX) 40 mg tablet Take 40 mg by mouth daily      venlafaxine (EFFEXOR-XR) 150 mg 24 hr capsule Take 150 mg by mouth daily  5    traMADol (ULTRAM) 50 mg tablet Take 50 mg by mouth every 6 (six) hours as needed      TRI-LO-SPRINTEC 0 18/0 215/0 25 MG-25 MCG per tablet Take 1 tablet by mouth daily for 116 days 90 tablet 1     No current facility-administered medications for this visit  Blood pressure 116/72, pulse 76, temperature 98 3 °F (36 8 °C), height 5' 2" (1 575 m), weight 66 kg (145 lb 6 4 oz)  PHYSICAL EXAM:     General Appearance:   Alert, cooperative, no distress, appears stated age    HEENT:   Normocephalic, atraumatic, anicteric      Neck:  Supple, symmetrical, trachea midline, no adenopathy;    thyroid: no enlargement/tenderness/nodules; no carotid  bruit or JVD    Lungs:   Clear to auscultation bilaterally; no rales, rhonchi or wheezing; respirations unlabored    Heart[de-identified]   S1 and S2 normal; regular rate and rhythm; no murmur, rub, or gallop     Abdomen:   Soft, non-tender, non-distended; normal bowel sounds; no masses, no organomegaly    Genitalia:   Deferred    Rectal:   Deferred    Extremities:  No cyanosis, clubbing or edema    Pulses:  2+ and symmetric all extremities    Skin:  Skin color, texture, turgor normal, no rashes or lesions    Lymph nodes:  No palpable cervical, axillary or inguinal lymphadenopathy        No results found for: WBC, HGB, HCT, MCV, PLT  No results found for: GLUCOSE, CALCIUM, NA, K, CO2, CL, BUN, CREATININE  No results found for: ALT, AST, GGT, ALKPHOS, BILITOT  No results found for: INR, PROTIME

## 2020-06-27 DIAGNOSIS — Z20.822 ENCOUNTER FOR LABORATORY TESTING FOR COVID-19 VIRUS: ICD-10-CM

## 2020-06-27 PROCEDURE — U0003 INFECTIOUS AGENT DETECTION BY NUCLEIC ACID (DNA OR RNA); SEVERE ACUTE RESPIRATORY SYNDROME CORONAVIRUS 2 (SARS-COV-2) (CORONAVIRUS DISEASE [COVID-19]), AMPLIFIED PROBE TECHNIQUE, MAKING USE OF HIGH THROUGHPUT TECHNOLOGIES AS DESCRIBED BY CMS-2020-01-R: HCPCS

## 2020-06-30 LAB — SARS-COV-2 RNA SPEC QL NAA+PROBE: NOT DETECTED

## 2020-07-01 ENCOUNTER — ANESTHESIA EVENT (OUTPATIENT)
Dept: GASTROENTEROLOGY | Facility: HOSPITAL | Age: 25
End: 2020-07-01

## 2020-07-01 ENCOUNTER — OFFICE VISIT (OUTPATIENT)
Dept: PODIATRY | Facility: CLINIC | Age: 25
End: 2020-07-01
Payer: COMMERCIAL

## 2020-07-01 VITALS
WEIGHT: 184.8 LBS | HEIGHT: 64 IN | TEMPERATURE: 97.7 F | HEART RATE: 80 BPM | BODY MASS INDEX: 31.55 KG/M2 | SYSTOLIC BLOOD PRESSURE: 118 MMHG | DIASTOLIC BLOOD PRESSURE: 82 MMHG

## 2020-07-01 DIAGNOSIS — M79.672 LEFT FOOT PAIN: ICD-10-CM

## 2020-07-01 DIAGNOSIS — M76.822 POSTERIOR TIBIAL TENDONITIS, LEFT: Primary | ICD-10-CM

## 2020-07-01 DIAGNOSIS — M21.42 PES PLANUS OF LEFT FOOT: ICD-10-CM

## 2020-07-01 PROCEDURE — 99203 OFFICE O/P NEW LOW 30 MIN: CPT | Performed by: PODIATRIST

## 2020-07-01 NOTE — PATIENT INSTRUCTIONS
Ankle Exercises   AMBULATORY CARE:   What you need to know about ankle exercises: Ankle exercises help strengthen your ankle and improve its function after injury  These are beginning exercises  Ask your healthcare provider if you need to see a physical therapist for more advanced exercises  · Do these exercises 3 to 5 days a week , or as directed by your healthcare provider  Ask if you should perform the exercises on each ankle  · Do the exercises in the order that your healthcare provider recommends  This will help prevent swelling, chronic pain, and reinjury  Start with range of motion exercises  Then progress to strengthening exercises, and finally to balancing exercises  · Warm up before you do ankle exercises  Walk or ride a stationary bike for 5 to 10 minutes to prepare your ankle for movement  · Stop if you feel pain  It is normal to feel some discomfort at first  Regular exercise will help decrease your discomfort over time  How to perform range of motion exercises safely:  Begin with range of motion exercises to improve flexibility  Ask your healthcare provider when you can progress to strengthening exercises  · Ankle alphabet:  Sit on a chair so that your feet do not touch the floor  Use your big toe to write each letter of the alphabet  Use only your foot and ankle, and keep your movements small  Do 2 sets  · Calf stretches:      ¨ Sitting calf stretches with a towel:  Sit on the floor with both legs out straight in front of you  Loop a towel around the ball of your injured foot  Grasp the ends of the towel and pull it toward you  Keep your leg and back straight  Do not lean forward as you pull the towel  Hold for 30 seconds  Then relax for 30 seconds  Do 2 sets of 10  ¨ Standing calf stretches:  Stand facing a wall with the foot that is not injured forward and your knee slightly bent   Keep the leg with the injured foot straight and behind you with your toes pointed in slightly  With both heels flat on the floor, press your hips forward  Do not arch your back  Hold for 30 seconds, and then relax for 30 seconds  Do 2 sets of 10  Repeat with your leg bent  Do 2 sets of 10  How to perform strengthening exercises safely:  After you can perform range of motion exercises without pain, you may begin strengthening exercises  Ask your healthcare provider when you can progress to balancing exercises  · Ankle movement in 4 directions:  Sit on the floor with your legs straight in front of you  Keep your heels on the floor for support  ¨ Dorsiflexion:  Begin with your toes pointing straight up  Pull your toes toward your body  Slowly return to the starting position  Do 3 sets of 5      ¨ Plantar flexion:  Begin with your toes pointing straight up  Push your toes away from your body  Slowly return to the starting position  Do 3 sets of 5            ¨ Inversion:  Begin with your toes pointing straight up  Push your toes inward, toward each other  Slowly return to the starting position  Do 3 sets of 5      ¨ Eversion:  Begin with your toes pointing straight up  Push your toes outward, away from each other  Slowly return to the starting position  Do 3 sets of 5          · Toe curls with a towel:  Sit on a chair so that both of your feet are flat on the floor  Place a small towel on the floor in front of your injured foot  Grab the center of the towel with your toes and curl the towel toward you  Relax and repeat  Do 1 set of 5            · [place] pick-ups:  Sit on a chair so that both of your feet are flat on the floor  Place 20 marbles on the floor in front of your injured foot  Use your toes to  one marble at a time and place it into a bowl  Repeat until you have picked up all the marbles  Do 1 set  · Heel raises:      ¨ Single leg heel raises:  Stand with your weight evenly on both feet  Hold on to a chair or a wall for balance   Lift the foot that is not injured off the floor so all your weight is placed on your injured foot  Raise the heel of your injured foot as high as you can  Slowly lower your heel to the floor  Do 1 set of 10  ¨ Double leg heel raises:  Stand with your weight evenly on both feet  Hold on to a chair or a wall for balance  Raise both of your heels as high as you can  Slowly lower your heels to the floor  Do 1 set of 10  · Heel and toe walks:      ¨ Heel walks:  Begin in a standing position  Lift your toes off the floor and walk on your heels  Keep your toes lifted as high as possible  Do 2 sets of 10  ¨ Toe walks:  Begin in a standing position  Lift your heels off the floor and walk on the balls and toes of your feet  Keep your heels lifted as high as possible  Do 2 sets of 10  How to perform a balance exercise safely:  After you can perform strengthening exercises without pain, you may do this beginning balancing exercise  Ask your healthcare provider for more advanced balance exercises  · Single leg stance:  Stand with your weight evenly on both feet, or hold on to a chair or a wall  Do not lean to the side  Lift the foot that is not injured off the floor so all your weight is placed on your injured foot  Balance on your injured foot  Ask your healthcare provider how long to hold this position  Contact your healthcare provider if:   · Your pain becomes worse  · You have new pain  · You have questions or concerns about your condition, care, or exercise program   © 2017 [address] Information is for End User's use only and may not be sold, redistributed or otherwise used for commercial purposes  All illustrations and images included in CareNotes® are the copyrighted property of [employer]  or [employer]  The above information is an  only  It is not intended as medical advice for individual conditions or treatments   Talk to your doctor, nurse or pharmacist before following any medical regimen to see if it is safe and effective for you

## 2020-07-01 NOTE — PROGRESS NOTES
Assessment/Plan:       Diagnoses and all orders for this visit:    Posterior tibial tendonitis, left  -     Ambulatory referral to Physical Therapy; Future    Left foot pain  -     X-ray foot left 3+ views; Future  -     Ambulatory referral to Physical Therapy; Future    Pes planus of left foot  -     Ambulatory referral to Physical Therapy; Future      Patient presents with acute strain to the tibial insertion on her left foot  This does not necessarily seem directly related to the flatfoot reconstruction she had as a teenager  Given that she spent 8 years with no pain in the foot the surgery seems to have been successful  It is a little unclear what caused the acute strain but on exam today the tendon is intact with very little pain  She does have tenderness at the insertion which is something that should respond well to formal physical therapy  In my opinion with a good arch support and formal PT she should get much better  I reviewed the patient's x-ray of her left foot  She does have retained hardware in the subtalar joint which appears to be in place and stable  She has good alignment of her rearfoot to forefoot showing good correction of  Her pediatric deformity  There is an anchor in the navicular from the previous tendon repair which also appears stable  Joint spaces are normal and I find nothing acutely wrong with the radiographic images  I did suggest patient wear a better arch support  She previously had custom supports when she was 14 but they no longer fit  She is wearing Dr Queenie Severino arch supports but they are clearly foam pads and not providing any support whatsoever  I did dispense pure stride arch supports to the patient today and fitted them in her shoes  This alone should help with her rehabilitation of the tendon insertion  I also educated the patient on eccentric exercises, heel raises, stretch band therapy for this type of injury      Subjective:      Patient ID: Solomon Mercer is a 23 y o  female  Patient presents for 2nd opinion  In 2012 she had B/L flatfoot reconstruction which sounds like arthroeresis and posterior tibial tendon repair with removal of navicular tuberosity  She recovered well and has been asymptomatic until last April  In April, she noticeed soreness in the left foot  On Saturday the foot was swollen, bruised and painful along the medial arch  She went to her surgeon and was told she tore her posterior tibial tendon  She was put in an air cast for 3 weeks and sent to PT  She did not go to PT and did not do anything at home for therapy  She has been wearing the aircast on and off and wearing her inserts  Some days it doesn't bother her at all  Some days it is severely painful  The following portions of the patient's history were reviewed and updated as appropriate: allergies, current medications, past family history, past medical history, past social history, past surgical history and problem list     Review of Systems   Constitutional: Negative  HENT: Negative for sinus pressure and sinus pain  Respiratory: Negative for cough and shortness of breath  Cardiovascular: Negative for leg swelling  Gastrointestinal: Negative for diarrhea and nausea  Musculoskeletal: Positive for arthralgias, gait problem, joint swelling and myalgias  Skin: Negative for color change and wound  Neurological: Negative for numbness  Objective:      /82   Pulse 80   Temp 97 7 °F (36 5 °C) (Tympanic)   Ht 5' 3 5" (1 613 m)   Wt 83 8 kg (184 lb 12 8 oz)   BMI 32 22 kg/m²          Physical Exam   Constitutional: She is oriented to person, place, and time  She appears well-developed  No distress  Pulmonary/Chest: Effort normal  No respiratory distress  Musculoskeletal: Normal range of motion  She exhibits tenderness  Neurological: She is alert and oriented to person, place, and time  No sensory deficit  Skin: Skin is warm  Capillary refill takes less than 2 seconds  No rash noted  No erythema  Psychiatric: She has a normal mood and affect  Vitals reviewed  A left foot exam was performed  General appearance: no acute distress, alert/oriented x3 and appropriate mood and affect  The examination was performed out of splint/cast  Skin: normal  Swelling: none, minimal and mild over navicular tuberosity  Warmth: no warmth  Tenderness: tenderness at PT insertion at navicular tuberosity  ROM: normal, equal bilaterally and normal STJ ROM left  Strength: normal, equal bilaterally and 5 of 5 tibialis posterior  Gait: moderate pes planus with too many toes  Mild early heel off  Stability: anterior drawer: negative, exterior rotation test: negative and Lachman: negative  Crepitus: no  Neurological Exam: normal and protective sensation with monofilament intact  Vascular Exam: normal, pulse present and hair pattern: normal  Lymphatic Exam: normal and no palpable nodes  Warren Test: ankle plantarflexes    XRay:   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

## 2020-07-02 ENCOUNTER — ANESTHESIA (OUTPATIENT)
Dept: GASTROENTEROLOGY | Facility: HOSPITAL | Age: 25
End: 2020-07-02

## 2020-07-02 ENCOUNTER — HOSPITAL ENCOUNTER (OUTPATIENT)
Dept: GASTROENTEROLOGY | Facility: HOSPITAL | Age: 25
Setting detail: OUTPATIENT SURGERY
Discharge: HOME | End: 2020-07-02
Attending: INTERNAL MEDICINE | Admitting: INTERNAL MEDICINE
Payer: COMMERCIAL

## 2020-07-02 VITALS
BODY MASS INDEX: 32.93 KG/M2 | SYSTOLIC BLOOD PRESSURE: 110 MMHG | RESPIRATION RATE: 17 BRPM | WEIGHT: 185.85 LBS | DIASTOLIC BLOOD PRESSURE: 67 MMHG | OXYGEN SATURATION: 98 % | HEIGHT: 63 IN | HEART RATE: 80 BPM | TEMPERATURE: 97.5 F

## 2020-07-02 DIAGNOSIS — K21.9 GASTROESOPHAGEAL REFLUX DISEASE WITHOUT ESOPHAGITIS: ICD-10-CM

## 2020-07-02 LAB
EXT PREGNANCY TEST URINE: NEGATIVE
EXT. CONTROL: NORMAL

## 2020-07-02 PROCEDURE — 88305 TISSUE EXAM BY PATHOLOGIST: CPT | Performed by: PATHOLOGY

## 2020-07-02 PROCEDURE — 43239 EGD BIOPSY SINGLE/MULTIPLE: CPT | Performed by: INTERNAL MEDICINE

## 2020-07-02 PROCEDURE — 81025 URINE PREGNANCY TEST: CPT | Performed by: INTERNAL MEDICINE

## 2020-07-02 RX ORDER — LIDOCAINE HYDROCHLORIDE 10 MG/ML
INJECTION, SOLUTION EPIDURAL; INFILTRATION; INTRACAUDAL; PERINEURAL AS NEEDED
Status: DISCONTINUED | OUTPATIENT
Start: 2020-07-02 | End: 2020-07-02 | Stop reason: SURG

## 2020-07-02 RX ORDER — SODIUM CHLORIDE, SODIUM LACTATE, POTASSIUM CHLORIDE, CALCIUM CHLORIDE 600; 310; 30; 20 MG/100ML; MG/100ML; MG/100ML; MG/100ML
INJECTION, SOLUTION INTRAVENOUS CONTINUOUS PRN
Status: DISCONTINUED | OUTPATIENT
Start: 2020-07-02 | End: 2020-07-02 | Stop reason: SURG

## 2020-07-02 RX ORDER — SODIUM CHLORIDE, SODIUM LACTATE, POTASSIUM CHLORIDE, CALCIUM CHLORIDE 600; 310; 30; 20 MG/100ML; MG/100ML; MG/100ML; MG/100ML
125 INJECTION, SOLUTION INTRAVENOUS CONTINUOUS
Status: DISCONTINUED | OUTPATIENT
Start: 2020-07-02 | End: 2020-07-06 | Stop reason: HOSPADM

## 2020-07-02 RX ORDER — PROPOFOL 10 MG/ML
INJECTION, EMULSION INTRAVENOUS AS NEEDED
Status: DISCONTINUED | OUTPATIENT
Start: 2020-07-02 | End: 2020-07-02 | Stop reason: SURG

## 2020-07-02 RX ADMIN — PROPOFOL 100 MG: 10 INJECTION, EMULSION INTRAVENOUS at 07:21

## 2020-07-02 RX ADMIN — PROPOFOL 50 MG: 10 INJECTION, EMULSION INTRAVENOUS at 07:24

## 2020-07-02 RX ADMIN — LIDOCAINE HYDROCHLORIDE 100 MG: 10 INJECTION, SOLUTION EPIDURAL; INFILTRATION; INTRACAUDAL; PERINEURAL at 07:21

## 2020-07-02 RX ADMIN — SODIUM CHLORIDE, SODIUM LACTATE, POTASSIUM CHLORIDE, AND CALCIUM CHLORIDE: .6; .31; .03; .02 INJECTION, SOLUTION INTRAVENOUS at 07:14

## 2020-07-02 RX ADMIN — PROPOFOL 50 MG: 10 INJECTION, EMULSION INTRAVENOUS at 07:23

## 2020-07-02 NOTE — ANESTHESIA POSTPROCEDURE EVALUATION
Post-Op Assessment Note    CV Status:  Stable  Pain Score: 0    Pain management: adequate     Mental Status:  Arousable   Hydration Status:  Stable   PONV Controlled:  None   Airway Patency:  Patent   Post Op Vitals Reviewed: Yes      Staff: Anesthesiologist, CRNA           /68 (07/02/20 0729)    Temp 97 5 °F (36 4 °C) (07/02/20 0729)    Pulse 91 (07/02/20 0729)   Resp 22 (07/02/20 0729)    SpO2 99 % (07/02/20 0729)

## 2020-07-02 NOTE — ANESTHESIA PREPROCEDURE EVALUATION
Review of Systems/Medical History  Patient summary reviewed  Chart reviewed  No history of anesthetic complications     Cardiovascular   Pulmonary       GI/Hepatic    GERD ,             Endo/Other    Obesity    GYN       Hematology   Musculoskeletal       Neurology   Psychology   Anxiety,              Physical Exam    Airway    Mallampati score: II  TM Distance: >3 FB  Neck ROM: full     Dental   No notable dental hx     Cardiovascular  Cardiovascular exam normal    Pulmonary  Pulmonary exam normal Breath sounds clear to auscultation,     Other Findings        Anesthesia Plan  ASA Score- 2     Anesthesia Type- IV sedation with anesthesia with ASA Monitors  Additional Monitors:   Airway Plan:         Plan Factors- Patient instructed to abstain from smoking on day of procedure       Induction- intravenous  Postoperative Plan-     Informed Consent- Anesthetic plan and risks discussed with patient  I personally reviewed this patient with the CRNA  Discussed and agreed on the Anesthesia Plan with the CRNA  Luis Fernando Matias No results found for: WBC, HGB, HCT, MCV, PLT  No results found for: GLUCOSE, CALCIUM, NA, K, CO2, CL, BUN, CREATININE  No results found for: INR, PROTIME  No results found for: PTT  Type and Screen:          Discussed with pt the benefits/alternatives and risks or General Anesthesia including breathing tube remaining in place if not strong enough, PONV, damage to lips and teeth, sore throat, eye injury or blindness    I, Dr Kilo Verdugo, the attending physician, have personally seen and evaluated the patient prior to anesthetic care  I have reviewed the pre-anesthetic record, and other medical records if appropriate to the anesthetic care  If a CRNA is involved in the case, I have reviewed the CRNA assessment, if present, and agree  The patient is in a suitable condition to proceed with my formulated anesthetic plan

## 2020-07-02 NOTE — INTERVAL H&P NOTE
H&P reviewed  After examining the patient I find no changes in the patients condition since the H&P had been written      Vitals:    07/02/20 0648   BP: 123/73   Pulse: 92   Resp: 18   Temp: (!) 97 2 °F (36 2 °C)   SpO2: 99%

## 2020-07-02 NOTE — DISCHARGE INSTRUCTIONS
Upper Endoscopy   WHAT YOU NEED TO KNOW:   An upper endoscopy is also called an upper gastrointestinal (GI) endoscopy, or an esophagogastroduodenoscopy (EGD)  You may feel bloated, gassy, or have some abdominal discomfort after your procedure  Your throat may be sore for 24 to 36 hours  You may burp or pass gas from air that is still inside your body  DISCHARGE INSTRUCTIONS:   Call 911 for any of the following:   · You have sudden chest pain or trouble breathing  Seek care immediately if:   · You feel dizzy or faint  · You have trouble swallowing  · Your bowel movements are very dark or black  · Your abdomen is hard and firm and you have severe pain  · You vomit blood  Contact your healthcare provider if:   · You feel full or bloated and cannot burp or pass gas  · You have not had a bowel movement for 3 days after your procedure  · You have neck pain  · You have a fever or chills  · You have nausea or are vomiting  · You have a rash or hives  · You have questions or concerns about your endoscopy  Relieve a sore throat:  Suck on throat lozenges or crushed ice  Gargle with a small amount of warm salt water  Mix 1 teaspoon of salt and 1 cup of warm water to make salt water  Relieve gas and discomfort from bloating:  Lie on your right side with a heating pad on your abdomen  Take short walks to help pass gas  Eat small meals until bloating is relieved  Rest after your procedure: You have been given medicine to relax you  Do not  drive or make important decisions until the day after your procedure  Return to your normal activity as directed  You can usually return to work the day after your procedure  Follow up with your healthcare provider as directed:  Write down your questions so you remember to ask them during your visits     © 2017 [address] is for End User's use only and may not be sold, redistributed or otherwise used for commercial purposes  All illustrations and images included in CareNotes® are the copyrighted property of [employer]  or [employer]  The above information is an  only  It is not intended as medical advice for individual conditions or treatments  Talk to your doctor, nurse or pharmacist before following any medical regimen to see if it is safe and effective for you

## 2020-07-08 ENCOUNTER — TELEPHONE (OUTPATIENT)
Dept: GASTROENTEROLOGY | Facility: CLINIC | Age: 25
End: 2020-07-08

## 2020-07-08 NOTE — TELEPHONE ENCOUNTER
----- Message from Yanci Potts MD sent at 7/8/2020 10:22 AM EDT -----  Tell her that all of the biopsies are negative

## 2020-07-20 ENCOUNTER — TELEPHONE (OUTPATIENT)
Dept: OBGYN CLINIC | Facility: OTHER | Age: 25
End: 2020-07-20

## 2020-07-20 NOTE — TELEPHONE ENCOUNTER
Called patient- returned her call regarding a statement she received for her Podiatry visit on 7/1/20  I called the Single Billing office and spoke with Ponce  Per Ponce claim is currently pending with [facility] and patient should not be receiving a statement yet  I left a message for the patient to call the office

## 2020-08-27 ENCOUNTER — TELEPHONE (OUTPATIENT)
Dept: GASTROENTEROLOGY | Facility: CLINIC | Age: 25
End: 2020-08-27

## 2020-08-27 NOTE — TELEPHONE ENCOUNTER
Omar Hughes pt-  Patient is experiencing acid reflux and sharp pain     She has scheduled an appt 09/17 with Jazmin     Uses:[facility] [phone]  Please phone [phone] to advise  Sivla Kasper

## 2020-08-28 NOTE — TELEPHONE ENCOUNTER
KWABENA: Spoke with patient  History of GERD    Patient c/o 1 episode of reflux and upper abdominal sharp pain  She is taking pepcid PRN  Has not taken any medication recently  Patient understands her reflux has many triggers, and she can restart her pepcid taper when needed to help her symptoms  Denies nausea, vomiting, abdominal pain, SOB,weakness  Advised patient to follow antireflux diet, and measures   She will call us if symptoms persist

## 2020-09-05 ENCOUNTER — OFFICE VISIT (OUTPATIENT)
Dept: URGENT CARE | Facility: MEDICAL CENTER | Age: 25
End: 2020-09-05
Payer: COMMERCIAL

## 2020-09-05 VITALS
DIASTOLIC BLOOD PRESSURE: 71 MMHG | HEIGHT: 62 IN | HEART RATE: 96 BPM | BODY MASS INDEX: 34.23 KG/M2 | TEMPERATURE: 97.5 F | RESPIRATION RATE: 18 BRPM | OXYGEN SATURATION: 98 % | WEIGHT: 186 LBS | SYSTOLIC BLOOD PRESSURE: 121 MMHG
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
## 2020-09-05 PROCEDURE — G0382 LEV 3 HOSP TYPE B ED VISIT: HCPCS | Performed by: PHYSICIAN ASSISTANT

## 2020-09-05 RX ORDER — AMOXICILLIN AND CLAVULANATE POTASSIUM 875; 125 MG/1; MG/1
1 TABLET, FILM COATED ORAL EVERY 12 HOURS SCHEDULED
Qty: 14 TABLET | Refills: 0 | Status: SHIPPED | OUTPATIENT
Start: 2020-09-05 | End: 2020-09-12

## 2020-09-05 NOTE — PATIENT INSTRUCTIONS
Cat bite  augmentin twice daily  Follow up with PCP in 3-5 days  Proceed to  ER if symptoms worsen  Animal Bite   WHAT YOU NEED TO KNOW:   Animal bite injuries range from shallow cuts to deep, life-threatening wounds  An animal can cut or puncture the skin when it bites  Your skin may be torn from your body  Your skin may swell or bruise even if the bite does not break the skin  Animal bites occur more often on the hands, arms, legs, and face  Bites from dogs and cats are the most common injuries  DISCHARGE INSTRUCTIONS:   Return to the emergency department if:   · You have a fever  · Your wound is red, swollen, and draining pus  · You see red streaks on the skin around the wound  · You can no longer move the bitten area  · Your heartbeat and breathing are much faster than usual     · You feel dizzy and confused  Contact your healthcare provider if:   · Your pain does not get better, even after you take pain medicine  · You have nightmares or flashbacks about the animal bite  · You have questions or concerns about your condition or care  Medicines: You may need any of the following:  · Antibiotics  prevent or treat a bacterial infection  · Prescription pain medicine  may be given  Ask how to take this medicine safely  · A tetanus vaccine  may be needed to prevent tetanus  Tetanus is a life-threatening bacterial infection that affects the nerves and muscles  The bacteria can be spread through animal bites  · A rabies vaccine  may be needed to prevent rabies  Rabies is a life-threatening viral infection  The virus can be spread through animal bites  · Take your medicine as directed  Contact your healthcare provider if you think your medicine is not helping or if you have side effects  Tell him of her if you are allergic to any medicine  Keep a list of the medicines, vitamins, and herbs you take  Include the amounts, and when and why you take them   Bring the list or the pill bottles to follow-up visits  Carry your medicine list with you in case of an emergency  Follow up with your healthcare provider in 1 to 2 days: You may need to return to have your stitches removed  Write down your questions so you remember to ask them during your visits  Self-care:   · Apply antibiotic ointment as directed  This helps prevent infection in minor skin wounds  It is available without a doctor's order  · Keep the wound clean and covered  Wash the wound every day with soap and water or germ-killing cleanser  Ask your healthcare provider about the kinds of bandages to use  · Apply ice on your wound  Ice helps decrease swelling and pain  Ice may also help prevent tissue damage  Use an ice pack, or put crushed ice in a plastic bag  Cover it with a towel and place it on your wound for 15 to 20 minutes every hour or as directed  · Elevate the wound area  Raise your wound above the level of your heart as often as you can  This will help decrease swelling and pain  Prop your wound on pillows or blankets to keep it elevated comfortably  Prevent another animal bite:   · Learn to recognize the signs of a scared or angry pet  Avoid quick, sudden movements  · Do not step between animals that are fighting  · Do not leave a pet alone with a young child  · Do not disturb an animal while it eats, sleeps, or cares for its young  · Do not approach an animal you do not know, especially one that is tied up or caged  · Stay away from animals that seem sick or act strangely  · Do not feed or capture wild animals  © 2017 [employer]  Information is for End User's use only and may not be sold, redistributed or otherwise used for commercial purposes  All illustrations and images included in CareNotes® are the copyrighted property of [employer]  or [employer]  The above information is an  only   It is not intended as medical advice for individual conditions or treatments  Talk to your doctor, nurse or pharmacist before following any medical regimen to see if it is safe and effective for you

## 2020-09-05 NOTE — PROGRESS NOTES
[facility]        NAME: Brandon Olguin is a 23 y o  female  : 1995    MRN: [id number]  DATE: 2020  TIME: 3:48 PM    Assessment and Plan   Cat bite, initial encounter [W55 [id number]]  1  Cat bite, initial encounter  amoxicillin-clavulanate (AUGMENTIN) 875-125 mg per tablet         Patient Instructions     Cat bite  augmentin twice daily  Follow up with PCP in 3-5 days  Proceed to  ER if symptoms worsen  Chief Complaint     Chief Complaint   Patient presents with    Cat Bite     Pt 's cat bit her in the left hand two days ago  Hand is now red and warm  History of Present Illness       22 y/o female presents c/o pain to left hand as her cat bit her  States the cat is strictly inside cat  Denies fever, chills      Review of Systems   Review of Systems   Constitutional: Negative  HENT: Negative  Eyes: Negative  Respiratory: Negative  Negative for cough, chest tightness, shortness of breath, wheezing and stridor  Cardiovascular: Negative  Negative for chest pain, palpitations and leg swelling           Current Medications       Current Outpatient Medications:     drospirenone-ethinyl estradiol (ROGELIO) 3-0 03 MG per tablet, Take 1 tablet by mouth daily, Disp: , Rfl:     venlafaxine (EFFEXOR-XR) 150 mg 24 hr capsule, Take 150 mg by mouth daily, Disp: , Rfl: 5    amoxicillin-clavulanate (AUGMENTIN) 875-125 mg per tablet, Take 1 tablet by mouth every 12 (twelve) hours for 7 days, Disp: 14 tablet, Rfl: 0    clonazePAM (KlonoPIN) 0 5 mg tablet, Take 0 5 mg by mouth 2 (two) times a day as needed for seizures, Disp: , Rfl:     traMADol (ULTRAM) 50 mg tablet, Take 50 mg by mouth every 6 (six) hours as needed, Disp: , Rfl:     Current Allergies     Allergies as of 2020    (No Known Allergies)            The following portions of the patient's history were reviewed and updated as appropriate: allergies, current medications, past family history, past medical history, past social history, past surgical history and problem list      No past medical history on file  Past Surgical History:   Procedure Laterality Date    FOOT TENDON SURGERY Bilateral 2012    Repair of flat feet       Family History   Problem Relation Age of Onset    No Known Problems Mother     No Known Problems Father          Medications have been verified  Objective   /71   Pulse 96   Temp 97 5 °F (36 4 °C) (Temporal)   Resp 18   Ht 5' 2" (1 575 m)   Wt 84 4 kg (186 lb)   SpO2 98%   BMI 34 02 kg/m²        Physical Exam     Physical Exam  Constitutional:       General: She is not in acute distress  Appearance: She is well-developed  She is not diaphoretic  HENT:      Head: Normocephalic and atraumatic  Right Ear: Hearing, tympanic membrane, ear canal and external ear normal       Left Ear: Hearing, tympanic membrane, ear canal and external ear normal       Mouth/Throat:      Pharynx: Uvula midline  Eyes:      Conjunctiva/sclera: Conjunctivae normal       Pupils: Pupils are equal, round, and reactive to light  Neck:      Musculoskeletal: Normal range of motion and neck supple  Cardiovascular:      Rate and Rhythm: Normal rate and regular rhythm  Heart sounds: Normal heart sounds  Pulmonary:      Effort: Pulmonary effort is normal       Breath sounds: Normal breath sounds  Musculoskeletal:      Left hand: She exhibits tenderness and swelling  She exhibits normal range of motion, no bony tenderness, normal two-point discrimination, normal capillary refill, no deformity and no laceration  Hands:    Lymphadenopathy:      Cervical: Cervical adenopathy present

## 2021-06-18 ENCOUNTER — HOSPITAL ENCOUNTER (OUTPATIENT)
Dept: RADIOLOGY | Facility: HOSPITAL | Age: 26
Discharge: HOME | End: 2021-06-18
Payer: COMMERCIAL

## 2021-06-18 DIAGNOSIS — R10.9 ABDOMINAL PAIN: ICD-10-CM

## 2021-06-18 PROCEDURE — 76700 US EXAM ABDOM COMPLETE: CPT

## 2021-06-19 ENCOUNTER — HOSPITAL ENCOUNTER (INPATIENT)
Facility: HOSPITAL | Age: 26
LOS: 2 days | Discharge: HOME | DRG: 419 | End: 2021-06-21
Attending: EMERGENCY MEDICINE | Admitting: SURGERY
Payer: COMMERCIAL

## 2021-06-19 DIAGNOSIS — K80.20 CHOLELITHIASIS: ICD-10-CM

## 2021-06-19 DIAGNOSIS — K80.00 ACUTE CALCULOUS CHOLECYSTITIS: Primary | ICD-10-CM

## 2021-06-19 LAB
ALBUMIN SERPL BCP-MCNC: 2.9 G/DL (ref 3.5–5)
ALP SERPL-CCNC: 112 U/L (ref 46–116)
ALT SERPL W P-5'-P-CCNC: 18 U/L (ref 12–78)
ANION GAP SERPL CALCULATED.3IONS-SCNC: 10 MMOL/L (ref 4–13)
AST SERPL W P-5'-P-CCNC: 11 U/L (ref 5–45)
BASOPHILS # BLD AUTO: 0.04 THOUSANDS/ΜL (ref 0–0.1)
BASOPHILS NFR BLD AUTO: 0 % (ref 0–1)
BILIRUB SERPL-MCNC: 0.21 MG/DL (ref 0.2–1)
BILIRUB UR QL STRIP: NEGATIVE
BUN SERPL-MCNC: 10 MG/DL (ref 5–25)
CALCIUM ALBUM COR SERPL-MCNC: 9.8 MG/DL (ref 8.3–10.1)
CALCIUM SERPL-MCNC: 8.9 MG/DL (ref 8.3–10.1)
CHLORIDE SERPL-SCNC: 103 MMOL/L (ref 100–108)
CLARITY UR: CLEAR
CO2 SERPL-SCNC: 27 MMOL/L (ref 21–32)
COLOR UR: YELLOW
CREAT SERPL-MCNC: 0.88 MG/DL (ref 0.6–1.3)
EOSINOPHIL # BLD AUTO: 0.08 THOUSAND/ΜL (ref 0–0.61)
EOSINOPHIL NFR BLD AUTO: 1 % (ref 0–6)
ERYTHROCYTE [DISTWIDTH] IN BLOOD BY AUTOMATED COUNT: 13.9 % (ref 11.6–15.1)
EXT PREG TEST URINE: NEGATIVE
EXT. CONTROL ED NAV: NORMAL
GFR SERPL CREATININE-BSD FRML MDRD: 92 ML/MIN/1.73SQ M
GLUCOSE SERPL-MCNC: 124 MG/DL (ref 65–140)
GLUCOSE UR STRIP-MCNC: NEGATIVE MG/DL
HCT VFR BLD AUTO: 41 % (ref 34.8–46.1)
HGB BLD-MCNC: 13.2 G/DL (ref 11.5–15.4)
HGB UR QL STRIP.AUTO: NEGATIVE
IMM GRANULOCYTES # BLD AUTO: 0.04 THOUSAND/UL (ref 0–0.2)
IMM GRANULOCYTES NFR BLD AUTO: 0 % (ref 0–2)
KETONES UR STRIP-MCNC: ABNORMAL MG/DL
LEUKOCYTE ESTERASE UR QL STRIP: NEGATIVE
LIPASE SERPL-CCNC: 114 U/L (ref 73–393)
LYMPHOCYTES # BLD AUTO: 1.63 THOUSANDS/ΜL (ref 0.6–4.47)
LYMPHOCYTES NFR BLD AUTO: 16 % (ref 14–44)
MCH RBC QN AUTO: 27.7 PG (ref 26.8–34.3)
MCHC RBC AUTO-ENTMCNC: 32.2 G/DL (ref 31.4–37.4)
MCV RBC AUTO: 86 FL (ref 82–98)
MONOCYTES # BLD AUTO: 0.4 THOUSAND/ΜL (ref 0.17–1.22)
MONOCYTES NFR BLD AUTO: 4 % (ref 4–12)
NEUTROPHILS # BLD AUTO: 8.31 THOUSANDS/ΜL (ref 1.85–7.62)
NEUTS SEG NFR BLD AUTO: 79 % (ref 43–75)
NITRITE UR QL STRIP: NEGATIVE
NRBC BLD AUTO-RTO: 0 /100 WBCS
PH UR STRIP.AUTO: 5.5 [PH] (ref 4.5–8)
PLATELET # BLD AUTO: 432 THOUSANDS/UL (ref 149–390)
PMV BLD AUTO: 9.6 FL (ref 8.9–12.7)
POTASSIUM SERPL-SCNC: 3.4 MMOL/L (ref 3.5–5.3)
PROT SERPL-MCNC: 7.6 G/DL (ref 6.4–8.2)
PROT UR STRIP-MCNC: NEGATIVE MG/DL
RBC # BLD AUTO: 4.77 MILLION/UL (ref 3.81–5.12)
SODIUM SERPL-SCNC: 140 MMOL/L (ref 136–145)
SP GR UR STRIP.AUTO: >=1.03 (ref 1–1.03)
UROBILINOGEN UR QL STRIP.AUTO: 0.2 E.U./DL
WBC # BLD AUTO: 10.5 THOUSAND/UL (ref 4.31–10.16)

## 2021-06-19 PROCEDURE — 83690 ASSAY OF LIPASE: CPT | Performed by: PHYSICIAN ASSISTANT

## 2021-06-19 PROCEDURE — 99223 1ST HOSP IP/OBS HIGH 75: CPT | Performed by: SURGERY

## 2021-06-19 PROCEDURE — 80053 COMPREHEN METABOLIC PANEL: CPT | Performed by: PHYSICIAN ASSISTANT

## 2021-06-19 PROCEDURE — 96374 THER/PROPH/DIAG INJ IV PUSH: CPT

## 2021-06-19 PROCEDURE — 81025 URINE PREGNANCY TEST: CPT | Performed by: PHYSICIAN ASSISTANT

## 2021-06-19 PROCEDURE — 96361 HYDRATE IV INFUSION ADD-ON: CPT

## 2021-06-19 PROCEDURE — 99285 EMERGENCY DEPT VISIT HI MDM: CPT | Performed by: PHYSICIAN ASSISTANT

## 2021-06-19 PROCEDURE — 85025 COMPLETE CBC W/AUTO DIFF WBC: CPT | Performed by: PHYSICIAN ASSISTANT

## 2021-06-19 PROCEDURE — 36415 COLL VENOUS BLD VENIPUNCTURE: CPT | Performed by: PHYSICIAN ASSISTANT

## 2021-06-19 PROCEDURE — 99285 EMERGENCY DEPT VISIT HI MDM: CPT

## 2021-06-19 PROCEDURE — 81003 URINALYSIS AUTO W/O SCOPE: CPT

## 2021-06-19 RX ORDER — ACETAMINOPHEN 325 MG/1
650 TABLET ORAL EVERY 6 HOURS PRN
Status: DISCONTINUED | OUTPATIENT
Start: 2021-06-19 | End: 2021-06-21 | Stop reason: HOSPADM

## 2021-06-19 RX ORDER — DEXTROSE, SODIUM CHLORIDE, AND POTASSIUM CHLORIDE 5; .45; .15 G/100ML; G/100ML; G/100ML
110 INJECTION INTRAVENOUS CONTINUOUS
Status: DISCONTINUED | OUTPATIENT
Start: 2021-06-19 | End: 2021-06-20

## 2021-06-19 RX ORDER — HYDROMORPHONE HCL/PF 1 MG/ML
0.5 SYRINGE (ML) INJECTION
Status: DISCONTINUED | OUTPATIENT
Start: 2021-06-19 | End: 2021-06-20

## 2021-06-19 RX ORDER — HEPARIN SODIUM 5000 [USP'U]/ML
5000 INJECTION, SOLUTION INTRAVENOUS; SUBCUTANEOUS EVERY 8 HOURS SCHEDULED
Status: DISCONTINUED | OUTPATIENT
Start: 2021-06-19 | End: 2021-06-21 | Stop reason: HOSPADM

## 2021-06-19 RX ORDER — ONDANSETRON 2 MG/ML
4 INJECTION INTRAMUSCULAR; INTRAVENOUS EVERY 6 HOURS PRN
Status: DISCONTINUED | OUTPATIENT
Start: 2021-06-19 | End: 2021-06-21 | Stop reason: HOSPADM

## 2021-06-19 RX ORDER — OXYCODONE HYDROCHLORIDE 5 MG/1
5 TABLET ORAL EVERY 4 HOURS PRN
Status: DISCONTINUED | OUTPATIENT
Start: 2021-06-19 | End: 2021-06-21 | Stop reason: HOSPADM

## 2021-06-19 RX ORDER — KETOROLAC TROMETHAMINE 30 MG/ML
15 INJECTION, SOLUTION INTRAMUSCULAR; INTRAVENOUS ONCE
Status: COMPLETED | OUTPATIENT
Start: 2021-06-19 | End: 2021-06-19

## 2021-06-19 RX ORDER — CEFAZOLIN SODIUM 2 G/50ML
2000 SOLUTION INTRAVENOUS EVERY 8 HOURS
Status: DISCONTINUED | OUTPATIENT
Start: 2021-06-19 | End: 2021-06-20

## 2021-06-19 RX ORDER — VENLAFAXINE HYDROCHLORIDE 150 MG/1
150 CAPSULE, EXTENDED RELEASE ORAL DAILY
Status: DISCONTINUED | OUTPATIENT
Start: 2021-06-20 | End: 2021-06-21 | Stop reason: HOSPADM

## 2021-06-19 RX ADMIN — CEFAZOLIN SODIUM 2000 MG: 2 SOLUTION INTRAVENOUS at 22:05

## 2021-06-19 RX ADMIN — DEXTROSE, SODIUM CHLORIDE, AND POTASSIUM CHLORIDE 110 ML/HR: 5; .45; .15 INJECTION INTRAVENOUS at 22:56

## 2021-06-19 RX ADMIN — METRONIDAZOLE 500 MG: 500 INJECTION, SOLUTION INTRAVENOUS at 21:18

## 2021-06-19 RX ADMIN — SODIUM CHLORIDE 1000 ML: 0.9 INJECTION, SOLUTION INTRAVENOUS at 18:13

## 2021-06-19 RX ADMIN — HEPARIN SODIUM 5000 UNITS: 5000 INJECTION INTRAVENOUS; SUBCUTANEOUS at 22:06

## 2021-06-19 RX ADMIN — KETOROLAC TROMETHAMINE 15 MG: 30 INJECTION, SOLUTION INTRAMUSCULAR at 18:13

## 2021-06-19 NOTE — H&P
H&P- General Surgery   Maksim Tierney 27 y o  female MRN: [id number]  Unit/Bed#: ED 13 Encounter: [id number]    Assessment/Plan   Assessment:  28 yo female with acute calculous cholecystitis  Plan:  Admit to General Surgery  OR tomorrow for lap cedric  NPO @ [facility]  Ancef/Flagyl  MIVF  T&S      History of Present Illness   HPI:  Maksim Tierney is a 27 y o  female with history of biliary cholic who presents with sharp constant RUQ abdominal pain since Monday  She has had similar pains in the past that went away on their own, but never have lasted this long  Had a RUQ US yesterday that revealed gallstones  She denied nausea, emesis, fevers, chills, shoulder pain, diarrhea, constipation, or chest pain  She denied any prior abdominal surgeries  Consults    Review of Systems   Constitutional: Negative  HENT: Negative  Eyes: Negative  Respiratory: Negative  Cardiovascular: Negative  Gastrointestinal: Positive for abdominal pain  Negative for constipation, diarrhea, nausea and vomiting  Endocrine: Negative  Genitourinary: Negative  Musculoskeletal: Negative  Skin: Negative  Allergic/Immunologic: Negative  Neurological: Negative  Hematological: Negative  Psychiatric/Behavioral: Negative  Historical Information   History reviewed  No pertinent past medical history    Past Surgical History:   Procedure Laterality Date    FOOT TENDON SURGERY Bilateral 2012    Repair of flat feet     Social History   Social History     Substance and Sexual Activity   Alcohol Use Not Currently     Social History     Substance and Sexual Activity   Drug Use No     E-Cigarette/Vaping    E-Cigarette Use Never User      E-Cigarette/Vaping Substances    Nicotine No     THC No     CBD No     Flavoring No     Other No     Unknown No      Social History     Tobacco Use   Smoking Status Never Smoker   Smokeless Tobacco Never Used     Family History:   Family History   Problem Relation Age of Onset  No Known Problems Mother     No Known Problems Father        Meds/Allergies   current meds:   Current Facility-Administered Medications   Medication Dose Route Frequency    acetaminophen (TYLENOL) tablet 650 mg  650 mg Oral Q6H PRN    ceFAZolin (ANCEF) IVPB (premix in dextrose) 2,000 mg 50 mL  2,000 mg Intravenous Q8H    dextrose 5 % and sodium chloride 0 45 % with KCl 20 mEq/L infusion  110 mL/hr Intravenous Continuous    heparin (porcine) subcutaneous injection 5,000 Units  5,000 Units Subcutaneous Q8H [facility]    HYDROmorphone (DILAUDID) injection 0 5 mg  0 5 mg Intravenous Q3H PRN    metroNIDAZOLE (FLAGYL) IVPB (premix) 500 mg 100 mL  500 mg Intravenous Q8H    [START ON 6/20/2021] norgestrel-ethinyl estradiol (LO/OVRAL) 0 3 mg-30 mcg per tablet 1 tablet  1 tablet Oral Daily    ondansetron (ZOFRAN) injection 4 mg  4 mg Intravenous Q6H PRN    oxyCODONE (ROXICODONE) IR tablet 5 mg  5 mg Oral Q4H PRN    [START ON 6/20/2021] venlafaxine (EFFEXOR-XR) 24 hr capsule 150 mg  150 mg Oral Daily    and PTA meds:   Prior to Admission Medications   Prescriptions Last Dose Informant Patient Reported? Taking?    clonazePAM (KlonoPIN) 0 5 mg tablet More than a month at Unknown time  Yes No   Sig: Take 0 5 mg by mouth 2 (two) times a day as needed for seizures   drospirenone-ethinyl estradiol (ROGELIO) 3-0 03 MG per tablet 6/18/2021 at Unknown time Self Yes Yes   Sig: Take 1 tablet by mouth daily   venlafaxine (EFFEXOR-XR) 150 mg 24 hr capsule 6/19/2021 at Unknown time Self Yes Yes   Sig: Take 150 mg by mouth daily      Facility-Administered Medications: None     No Known Allergies    Objective   First Vitals:   Blood Pressure: 146/83 (06/19/21 1638)  Pulse: 104 (06/19/21 1638)  Temperature: 98 1 °F (36 7 °C) (06/19/21 1638)  Temp Source: Oral (06/19/21 1638)  Respirations: 18 (06/19/21 1638)  Height: 5' 2" (157 5 cm) (06/19/21 1638)  Weight - Scale: 92 1 kg (203 lb) (06/19/21 1638)  SpO2: 99 % (06/19/21 1638)    Current Vitals:   Blood Pressure: 146/88 (06/19/21 1815)  Pulse: 96 (06/19/21 1815)  Temperature: 98 1 °F (36 7 °C) (06/19/21 1638)  Temp Source: Oral (06/19/21 1638)  Respirations: 18 (06/19/21 1815)  Height: 5' 2" (157 5 cm) (06/19/21 1638)  Weight - Scale: 92 1 kg (203 lb) (06/19/21 1638)  SpO2: 100 % (06/19/21 1815)    No intake or output data in the 24 hours ending 06/19/21 1930    Invasive Devices     Peripheral Intravenous Line            Peripheral IV 06/19/21 Left Antecubital <1 day                Physical Exam  Vitals and nursing note reviewed  Constitutional:       General: She is not in acute distress  Appearance: She is well-developed  She is obese  She is not diaphoretic  HENT:      Head: Normocephalic and atraumatic  Right Ear: External ear normal       Left Ear: External ear normal       Nose: Nose normal       Mouth/Throat:      Mouth: Mucous membranes are moist       Pharynx: Oropharynx is clear  Eyes:      General: No scleral icterus  Extraocular Movements: Extraocular movements intact  Conjunctiva/sclera: Conjunctivae normal       Pupils: Pupils are equal, round, and reactive to light  Neck:      Vascular: No JVD  Trachea: No tracheal deviation  Cardiovascular:      Rate and Rhythm: Normal rate and regular rhythm  Pulses: Normal pulses  Pulmonary:      Effort: Pulmonary effort is normal  No respiratory distress  Chest:      Chest wall: No tenderness  Abdominal:      General: There is no distension  Palpations: Abdomen is soft  Tenderness: There is abdominal tenderness  There is no guarding or rebound  Comments: + [place]   Musculoskeletal:         General: No tenderness or deformity  Normal range of motion  Cervical back: Normal range of motion and neck supple  Skin:     General: Skin is warm and dry  Neurological:      General: No focal deficit present  Mental Status: She is alert and oriented to person, place, and time  Cranial Nerves: No cranial nerve deficit  Sensory: No sensory deficit  Psychiatric:         Mood and Affect: Mood normal          Behavior: Behavior normal          Thought Content: Thought content normal          Judgment: Judgment normal          Lab Results:   CBC:   Lab Results   Component Value Date    WBC 10 50 (H) 06/19/2021    HGB 13 2 06/19/2021    HCT 41 0 06/19/2021    MCV 86 06/19/2021     (H) 06/19/2021    MCH 27 7 06/19/2021    MCHC 32 2 06/19/2021    RDW 13 9 06/19/2021    MPV 9 6 06/19/2021    NRBC 0 06/19/2021   , CMP:   Lab Results   Component Value Date    SODIUM 140 06/19/2021    K 3 4 (L) 06/19/2021     06/19/2021    CO2 27 06/19/2021    BUN 10 06/19/2021    CREATININE 0 88 06/19/2021    CALCIUM 8 9 06/19/2021    AST 11 06/19/2021    ALT 18 06/19/2021    ALKPHOS 112 06/19/2021    EGFR 92 06/19/2021   , Coagulation: No results found for: PT, INR, APTT, Urinalysis:   Lab Results   Component Value Date    COLORU Yellow 06/19/2021    CLARITYU Clear 06/19/2021    SPECGRAV >=1 030 06/19/2021    PHUR 5 5 06/19/2021    LEUKOCYTESUR Negative 06/19/2021    NITRITE Negative 06/19/2021    GLUCOSEU Negative 06/19/2021    KETONESU Trace (A) 06/19/2021    BILIRUBINUR Negative 06/19/2021    BLOODU Negative 06/19/2021   , Amylase: No results found for: AMYLASE, Lipase:   Lab Results   Component Value Date    LIPASE 114 06/19/2021     Imaging:   Procedure: US abdomen complete    Result Date: 6/19/2021  Narrative: ABDOMEN ULTRASOUND, COMPLETE INDICATION:   R10 9: Unspecified abdominal pain  COMPARISON: None TECHNIQUE:   Real-time ultrasound of the abdomen was performed with a curvilinear transducer with both volumetric sweeps and still imaging techniques  FINDINGS: PANCREAS:  Visualized portions of the pancreas are within normal limits  AORTA AND IVC:  Visualized portions are normal for patient age  LIVER: Size:  Within normal range  The liver measures 17 0 cm in the midclavicular line  Contour:  Surface contour is smooth  Parenchyma:  Echogenicity and echotexture are within normal limits  No evidence of suspicious mass  Limited imaging of the main portal vein shows it to be patent and hepatopetal  BILIARY: Cholelithiasis is present  No intrahepatic biliary dilatation  CBD measures 3 mm  No choledocholithiasis  KIDNEY: Right kidney measures 10 2 x 4 3 x 4 8 cm  Within normal limits  Left kidney measures 10 1 x 4 9 x 3 8 cm  Within normal limits  SPLEEN: Measures 8 9 cm  Within normal limits  ASCITES:  None  Impression: Cholelithiasis without evidence of acute cholecystitis   Workstation performed: [id number]

## 2021-06-19 NOTE — ED PROVIDER NOTES
History  Chief Complaint   Patient presents with    Abdominal Pain     pt presents with lower right sided abdominal pain since last Monday, states was seen at Urgent Care and had bloodwork done and also had ultrasound done, awaiting results, denies n/v      Patient presents to the ER for evaluation of RUQ abdominal pain  The patient states that she began with abdominal pain around 5 days ago  The patient states that the pain is constant in her RUQ and is worse with movement and palpation  The patient denies anything that makes the pain better  States she has taken motrin over the past few days with minimal relief  Patient states her last dose of motrin was yesterday, denies any medication today  Denies worsening of pain after eating  Denies any abdominal trauma  Denies any PMH or PSH  Denies any fevers, vomiting, nausea, diarrhea, urinary symptoms, chest pain, dyspnea, shortness of breath, dizziness, syncope or any other concerning symptoms  Prior to Admission Medications   Prescriptions Last Dose Informant Patient Reported? Taking? clonazePAM (KlonoPIN) 0 5 mg tablet More than a month at Unknown time  Yes No   Sig: Take 0 5 mg by mouth 2 (two) times a day as needed for seizures   drospirenone-ethinyl estradiol (ROGELIO) 3-0 03 MG per tablet 6/18/2021 at Unknown time Self Yes Yes   Sig: Take 1 tablet by mouth daily   venlafaxine (EFFEXOR-XR) 150 mg 24 hr capsule 6/19/2021 at Unknown time Self Yes Yes   Sig: Take 150 mg by mouth daily      Facility-Administered Medications: None       History reviewed  No pertinent past medical history  Past Surgical History:   Procedure Laterality Date    FOOT TENDON SURGERY Bilateral 2012    Repair of flat feet       Family History   Problem Relation Age of Onset    No Known Problems Mother     No Known Problems Father      I have reviewed and agree with the history as documented      E-Cigarette/Vaping    E-Cigarette Use Never User      E-Cigarette/Vaping Substances    Nicotine No     THC No     CBD No     Flavoring No     Other No     Unknown No      Social History     Tobacco Use    Smoking status: Never Smoker    Smokeless tobacco: Never Used   Vaping Use    Vaping Use: Never used   Substance Use Topics    Alcohol use: Not Currently    Drug use: No       Review of Systems   Constitutional: Negative for fever  HENT: Negative for congestion, rhinorrhea and sore throat  Respiratory: Negative for shortness of breath  Cardiovascular: Negative for chest pain  Gastrointestinal: Positive for abdominal pain  Negative for nausea and vomiting  Genitourinary: Negative for dysuria  Musculoskeletal: Negative for back pain and neck pain  Skin: Negative for rash  Neurological: Negative for weakness, numbness and headaches  All other systems reviewed and are negative  Physical Exam  Physical Exam  Constitutional:       Appearance: She is well-developed  HENT:      Head: Normocephalic and atraumatic  Nose: Nose normal    Eyes:      Conjunctiva/sclera: Conjunctivae normal    Cardiovascular:      Rate and Rhythm: Normal rate  Pulmonary:      Effort: Pulmonary effort is normal    Abdominal:      General: Bowel sounds are normal       Palpations: Abdomen is soft  Tenderness: There is abdominal tenderness in the right upper quadrant  There is no right CVA tenderness or left CVA tenderness  Musculoskeletal:         General: Normal range of motion  Cervical back: Normal range of motion  Skin:     General: Skin is warm  Capillary Refill: Capillary refill takes less than 2 seconds  Neurological:      Mental Status: She is alert and oriented to person, place, and time           Vital Signs  ED Triage Vitals   Temperature Pulse Respirations Blood Pressure SpO2   06/19/21 1638 06/19/21 1638 06/19/21 1638 06/19/21 1638 06/19/21 1638   98 1 °F (36 7 °C) 104 18 146/83 99 %      Temp Source Heart Rate Source Patient Position - Orthostatic VS BP Location FiO2 (%)   06/19/21 1638 06/19/21 1638 06/19/21 1815 06/19/21 1815 --   Oral Monitor Lying Left arm       Pain Score       06/19/21 1813       5           Vitals:    06/19/21 1638 06/19/21 1815   BP: 146/83 146/88   Pulse: 104 96   Patient Position - Orthostatic VS:  Lying         Visual Acuity  Visual Acuity      Most Recent Value   L Pupil Size (mm)  3   R Pupil Size (mm)  3          ED Medications  Medications   dextrose 5 % and sodium chloride 0 45 % with KCl 20 mEq/L infusion (has no administration in time range)   ondansetron (ZOFRAN) injection 4 mg (has no administration in time range)   heparin (porcine) subcutaneous injection 5,000 Units (has no administration in time range)   acetaminophen (TYLENOL) tablet 650 mg (has no administration in time range)   oxyCODONE (ROXICODONE) IR tablet 5 mg (has no administration in time range)   HYDROmorphone (DILAUDID) injection 0 5 mg (has no administration in time range)   ceFAZolin (ANCEF) IVPB (premix in dextrose) 2,000 mg 50 mL (has no administration in time range)   metroNIDAZOLE (FLAGYL) IVPB (premix) 500 mg 100 mL (has no administration in time range)   venlafaxine (EFFEXOR-XR) 24 hr capsule 150 mg (has no administration in time range)   sodium chloride 0 9 % bolus 1,000 mL (1,000 mL Intravenous New Bag 6/19/21 1813)   ketorolac (TORADOL) injection 15 mg (15 mg Intravenous Given 6/19/21 1813)       Diagnostic Studies  Results Reviewed     Procedure Component Value Units Date/Time    Platelet count [[id number]]     Lab Status: No result Specimen: Blood     Comprehensive metabolic panel [[id number]]  (Abnormal) Collected: 06/19/21 1738    Lab Status: Final result Specimen: Blood from Arm, Right Updated: 06/19/21 1802     Sodium 140 mmol/L      Potassium 3 4 mmol/L      Chloride 103 mmol/L      CO2 27 mmol/L      ANION GAP 10 mmol/L      BUN 10 mg/dL      Creatinine 0 88 mg/dL      Glucose 124 mg/dL      Calcium 8 9 mg/dL      Corrected Calcium 9 8 mg/dL      AST 11 U/L      ALT 18 U/L      Alkaline Phosphatase 112 U/L      Total Protein 7 6 g/dL      Albumin 2 9 g/dL      Total Bilirubin 0 21 mg/dL      eGFR 92 ml/min/1 73sq m     Narrative:      [place] guidelines for Chronic Kidney Disease (CKD):     Stage 1 with normal or high GFR (GFR > 90 mL/min/1 73 square meters)    Stage 2 Mild CKD (GFR = 60-89 mL/min/1 73 square meters)    Stage 3A Moderate CKD (GFR = 45-59 mL/min/1 73 square meters)    Stage 3B Moderate CKD (GFR = 30-44 mL/min/1 73 square meters)    Stage 4 Severe CKD (GFR = 15-29 mL/min/1 73 square meters)    Stage 5 End Stage CKD (GFR <15 mL/min/1 73 square meters)  Note: GFR calculation is accurate only with a steady state creatinine    Lipase [[id number]]  (Normal) Collected: 06/19/21 1738    Lab Status: Final result Specimen: Blood from Arm, Right Updated: 06/19/21 1802     Lipase 114 u/L     POCT pregnancy, urine [[id number]]  (Normal) Resulted: 06/19/21 1757    Lab Status: Final result Updated: 06/19/21 1757     EXT PREG TEST UR (Ref: Negative) Negative     Control Valid    Urine Macroscopic, POC [[id number]]  (Abnormal) Collected: 06/19/21 1752    Lab Status: Final result Specimen: Urine Updated: 06/19/21 1753     Color, UA Yellow     Clarity, UA Clear     pH, UA 5 5     Leukocytes, UA Negative     Nitrite, UA Negative     Protein, UA Negative mg/dl      Glucose, UA Negative mg/dl      Ketones, UA Trace mg/dl      Urobilinogen, UA 0 2 E U /dl      Bilirubin, UA Negative     Blood, UA Negative     Specific Gravity, UA >=1 030    Narrative:      CLINITEK RESULT    CBC and differential [[id number]]  (Abnormal) Collected: 06/19/21 1738    Lab Status: Final result Specimen: Blood from Arm, Right Updated: 06/19/21 1746     WBC 10 50 Thousand/uL      RBC 4 77 Million/uL      Hemoglobin 13 2 g/dL      Hematocrit 41 0 %      MCV 86 fL      MCH 27 7 pg      MCHC 32 2 g/dL      RDW 13 9 %      MPV 9 6 fL Platelets 289 Thousands/uL      nRBC 0 /100 WBCs      Neutrophils Relative 79 %      Immat GRANS % 0 %      Lymphocytes Relative 16 %      Monocytes Relative 4 %      Eosinophils Relative 1 %      Basophils Relative 0 %      Neutrophils Absolute 8 31 Thousands/µL      Immature Grans Absolute 0 04 Thousand/uL      Lymphocytes Absolute 1 63 Thousands/µL      Monocytes Absolute 0 40 Thousand/µL      Eosinophils Absolute 0 08 Thousand/µL      Basophils Absolute 0 04 Thousands/µL                  No orders to display          FINDINGS:     PANCREAS:  Visualized portions of the pancreas are within normal limits      AORTA AND IVC:  Visualized portions are normal for patient age      LIVER:  Size:  Within normal range  The liver measures 17 0 cm in the midclavicular line  Contour:  Surface contour is smooth  Parenchyma:  Echogenicity and echotexture are within normal limits  No evidence of suspicious mass  Limited imaging of the main portal vein shows it to be patent and hepatopetal      BILIARY:  Cholelithiasis is present  No intrahepatic biliary dilatation  CBD measures 3 mm  No choledocholithiasis      KIDNEY:   Right kidney measures 10 2 x 4 3 x 4 8 cm  Within normal limits      Left kidney measures 10 1 x 4 9 x 3 8 cm  Within normal limits      SPLEEN:   Measures 8 9 cm  Within normal limits      ASCITES:  None      IMPRESSION:     Cholelithiasis without evidence of acute cholecystitis  Procedures  Procedures         ED Course  ED Course as of Jun 19 1940   Sat Jun 19, 2021   1640 Blood Pressure: 146/83   1640 Temperature: 98 1 °F (36 7 °C)   1640 Pulse: 104   1640 Respirations: 18   1640 SpO2: 99 %   1708 Discussed with radiology, will read abdominal u/s that was performed yesterday      1749 WBC(!): 10 50   1749 Hemoglobin: 13 2   1802 U/s from 6/18/21: IMPRESSION:     Cholelithiasis without evidence of acute cholecystitis        1802 PREGNANCY TEST URINE: Negative   1802 Leukocytes, UA: Negative 1802 Nitrite, UA: Negative   1802 Blood, UA: Negative   1809 Potassium(!): 3 4   1809 Creatinine: 0 88   1809 Lipase: 114   1818 Discussed with surgery as u/s was from yesterday and patients pain has changed  States will come see patient in ED      1824 Surgery at bedside      1911 Discussed with surgery who saw patient in the ED, will admit for cholecystectomy tomorrow  Okay with clear liquids til midnight then NPO at midnight  Will admit under Dr Trever Gonzalez service                                SBIRT 22yo+      Most Recent Value   SBIRT (23 yo +)   In order to provide better care to our patients, we are screening all of our patients for alcohol and drug use  Would it be okay to ask you these screening questions? Yes Filed at: 06/19/2021 1722   Initial Alcohol Screen: US AUDIT-C    1  How often do you have a drink containing alcohol?  0 Filed at: 06/19/2021 1722   2  How many drinks containing alcohol do you have on a typical day you are drinking? 0 Filed at: 06/19/2021 1722   3a  Male UNDER 65: How often do you have five or more drinks on one occasion? 0 Filed at: 06/19/2021 1722   3b  FEMALE Any Age, or MALE 65+: How often do you have 4 or more drinks on one occassion? 0 Filed at: 06/19/2021 1722   Audit-C Score  0 Filed at: 06/19/2021 1722   YONATHAN: How many times in the past year have you    Used an illegal drug or used a prescription medication for non-medical reasons? Never Filed at: 06/19/2021 1722                    MDM     Patient u/s shows acute cholelithiasis without acute cholecystitis  Patient with persistent pain x 5 days  Surgery consulted in the ER  Per surgery, will admit patient for cholecystectomy tomorrow morning  Patient stable throughout ER stay      Disposition  Final diagnoses:   Cholelithiasis     Time reflects when diagnosis was documented in both MDM as applicable and the Disposition within this note     Time User Action Codes Description Comment    6/19/2021  6:23 PM Vidya Mica  [K80 20] Cholelithiasis     6/19/2021  7:29 PM Satish Santamaria Kailash [K80 00] Acute calculous cholecystitis       ED Disposition     ED Disposition Condition Date/Time Comment    Admit Stable Sat Jun 19, 2021  7:12 PM Case was discussed with General Surgery and the patient's admission status was agreed to be Admission Status: observation status to the service of Dr Milton Homans   Follow-up Information    None         Patient's Medications   Discharge Prescriptions    No medications on file     No discharge procedures on file      PDMP Review     None          ED Provider  Electronically Signed by           Lorena Dunbar PA-C  06/19/21 1940

## 2021-06-19 NOTE — LETTER
[facility] FLOOR MED SURG UNIT  [facility] [address] [address]  Dept: [phone]    June 21, 2021     Patient: Kaya Villareal   YOB: 1995   Date of Visit: 6/19/2021       To Whom it May Concern:    Kaya Villareal is under my professional care  She was seen in the hospital from 6/19/2021   to 06/21/21  She may return to work on [facility] 6/28/21  If you have any questions or concerns, please don't hesitate to call           Sincerely,            Federica Sloan PA-C

## 2021-06-20 ENCOUNTER — ANESTHESIA (INPATIENT)
Dept: PERIOP | Facility: HOSPITAL | Age: 26
DRG: 419 | End: 2021-06-20
Payer: COMMERCIAL

## 2021-06-20 ENCOUNTER — ANESTHESIA EVENT (INPATIENT)
Dept: PERIOP | Facility: HOSPITAL | Age: 26
DRG: 419 | End: 2021-06-20
Payer: COMMERCIAL

## 2021-06-20 PROBLEM — K80.00 ACUTE CALCULOUS CHOLECYSTITIS: Status: ACTIVE | Noted: 2021-06-20

## 2021-06-20 LAB
ABO GROUP BLD: NORMAL
ALBUMIN SERPL BCP-MCNC: 2.5 G/DL (ref 3.5–5)
ALP SERPL-CCNC: 103 U/L (ref 46–116)
ALT SERPL W P-5'-P-CCNC: 14 U/L (ref 12–78)
ANION GAP SERPL CALCULATED.3IONS-SCNC: 8 MMOL/L (ref 4–13)
AST SERPL W P-5'-P-CCNC: 17 U/L (ref 5–45)
BILIRUB SERPL-MCNC: 0.25 MG/DL (ref 0.2–1)
BLD GP AB SCN SERPL QL: NEGATIVE
BUN SERPL-MCNC: 8 MG/DL (ref 5–25)
CALCIUM ALBUM COR SERPL-MCNC: 9.5 MG/DL (ref 8.3–10.1)
CALCIUM SERPL-MCNC: 8.3 MG/DL (ref 8.3–10.1)
CHLORIDE SERPL-SCNC: 107 MMOL/L (ref 100–108)
CO2 SERPL-SCNC: 24 MMOL/L (ref 21–32)
CREAT SERPL-MCNC: 0.68 MG/DL (ref 0.6–1.3)
GFR SERPL CREATININE-BSD FRML MDRD: 123 ML/MIN/1.73SQ M
GLUCOSE SERPL-MCNC: 108 MG/DL (ref 65–140)
POTASSIUM SERPL-SCNC: 4.1 MMOL/L (ref 3.5–5.3)
PROT SERPL-MCNC: 6.7 G/DL (ref 6.4–8.2)
RH BLD: POSITIVE
SODIUM SERPL-SCNC: 139 MMOL/L (ref 136–145)
SPECIMEN EXPIRATION DATE: NORMAL

## 2021-06-20 PROCEDURE — 47562 LAPAROSCOPIC CHOLECYSTECTOMY: CPT | Performed by: SURGERY

## 2021-06-20 PROCEDURE — 0FT44ZZ RESECTION OF GALLBLADDER, PERCUTANEOUS ENDOSCOPIC APPROACH: ICD-10-PCS | Performed by: SURGERY

## 2021-06-20 PROCEDURE — 86850 RBC ANTIBODY SCREEN: CPT | Performed by: ORTHOPAEDIC SURGERY

## 2021-06-20 PROCEDURE — 86901 BLOOD TYPING SEROLOGIC RH(D): CPT | Performed by: ORTHOPAEDIC SURGERY

## 2021-06-20 PROCEDURE — NC001 PR NO CHARGE: Performed by: SURGERY

## 2021-06-20 PROCEDURE — 88304 TISSUE EXAM BY PATHOLOGIST: CPT | Performed by: PATHOLOGY

## 2021-06-20 PROCEDURE — 86900 BLOOD TYPING SEROLOGIC ABO: CPT | Performed by: ORTHOPAEDIC SURGERY

## 2021-06-20 PROCEDURE — 99024 POSTOP FOLLOW-UP VISIT: CPT | Performed by: SURGERY

## 2021-06-20 PROCEDURE — 80053 COMPREHEN METABOLIC PANEL: CPT | Performed by: ORTHOPAEDIC SURGERY

## 2021-06-20 RX ORDER — SODIUM CHLORIDE, SODIUM LACTATE, POTASSIUM CHLORIDE, CALCIUM CHLORIDE 600; 310; 30; 20 MG/100ML; MG/100ML; MG/100ML; MG/100ML
INJECTION, SOLUTION INTRAVENOUS CONTINUOUS PRN
Status: DISCONTINUED | OUTPATIENT
Start: 2021-06-20 | End: 2021-06-20

## 2021-06-20 RX ORDER — MIDAZOLAM HYDROCHLORIDE 2 MG/2ML
INJECTION, SOLUTION INTRAMUSCULAR; INTRAVENOUS AS NEEDED
Status: DISCONTINUED | OUTPATIENT
Start: 2021-06-20 | End: 2021-06-20

## 2021-06-20 RX ORDER — PROPOFOL 10 MG/ML
INJECTION, EMULSION INTRAVENOUS AS NEEDED
Status: DISCONTINUED | OUTPATIENT
Start: 2021-06-20 | End: 2021-06-20

## 2021-06-20 RX ORDER — LIDOCAINE HYDROCHLORIDE 10 MG/ML
INJECTION, SOLUTION EPIDURAL; INFILTRATION; INTRACAUDAL; PERINEURAL AS NEEDED
Status: DISCONTINUED | OUTPATIENT
Start: 2021-06-20 | End: 2021-06-20

## 2021-06-20 RX ORDER — MAGNESIUM HYDROXIDE 1200 MG/15ML
LIQUID ORAL AS NEEDED
Status: DISCONTINUED | OUTPATIENT
Start: 2021-06-20 | End: 2021-06-20 | Stop reason: HOSPADM

## 2021-06-20 RX ORDER — HYDROMORPHONE HCL/PF 1 MG/ML
0.5 SYRINGE (ML) INJECTION
Status: DISCONTINUED | OUTPATIENT
Start: 2021-06-20 | End: 2021-06-21 | Stop reason: HOSPADM

## 2021-06-20 RX ORDER — OXYCODONE HYDROCHLORIDE 5 MG/1
5 TABLET ORAL EVERY 4 HOURS PRN
Qty: 10 TABLET | Refills: 0 | Status: SHIPPED | OUTPATIENT
Start: 2021-06-20 | End: 2021-06-30

## 2021-06-20 RX ORDER — HYDROMORPHONE HCL/PF 1 MG/ML
0.5 SYRINGE (ML) INJECTION
Status: DISCONTINUED | OUTPATIENT
Start: 2021-06-20 | End: 2021-06-20 | Stop reason: HOSPADM

## 2021-06-20 RX ORDER — ONDANSETRON 2 MG/ML
4 INJECTION INTRAMUSCULAR; INTRAVENOUS ONCE AS NEEDED
Status: COMPLETED | OUTPATIENT
Start: 2021-06-20 | End: 2021-06-20

## 2021-06-20 RX ORDER — ROCURONIUM BROMIDE 10 MG/ML
INJECTION, SOLUTION INTRAVENOUS AS NEEDED
Status: DISCONTINUED | OUTPATIENT
Start: 2021-06-20 | End: 2021-06-20

## 2021-06-20 RX ORDER — KETOROLAC TROMETHAMINE 30 MG/ML
INJECTION, SOLUTION INTRAMUSCULAR; INTRAVENOUS AS NEEDED
Status: DISCONTINUED | OUTPATIENT
Start: 2021-06-20 | End: 2021-06-20

## 2021-06-20 RX ORDER — ONDANSETRON 2 MG/ML
INJECTION INTRAMUSCULAR; INTRAVENOUS AS NEEDED
Status: DISCONTINUED | OUTPATIENT
Start: 2021-06-20 | End: 2021-06-20

## 2021-06-20 RX ORDER — DEXAMETHASONE SODIUM PHOSPHATE 10 MG/ML
INJECTION, SOLUTION INTRAMUSCULAR; INTRAVENOUS AS NEEDED
Status: DISCONTINUED | OUTPATIENT
Start: 2021-06-20 | End: 2021-06-20

## 2021-06-20 RX ORDER — FENTANYL CITRATE 50 UG/ML
INJECTION, SOLUTION INTRAMUSCULAR; INTRAVENOUS AS NEEDED
Status: DISCONTINUED | OUTPATIENT
Start: 2021-06-20 | End: 2021-06-20

## 2021-06-20 RX ORDER — SODIUM CHLORIDE 9 MG/ML
INJECTION, SOLUTION INTRAVENOUS AS NEEDED
Status: DISCONTINUED | OUTPATIENT
Start: 2021-06-20 | End: 2021-06-20 | Stop reason: HOSPADM

## 2021-06-20 RX ORDER — BUPIVACAINE HYDROCHLORIDE AND EPINEPHRINE 2.5; 5 MG/ML; UG/ML
INJECTION, SOLUTION EPIDURAL; INFILTRATION; INTRACAUDAL; PERINEURAL AS NEEDED
Status: DISCONTINUED | OUTPATIENT
Start: 2021-06-20 | End: 2021-06-20 | Stop reason: HOSPADM

## 2021-06-20 RX ADMIN — HYDROMORPHONE HYDROCHLORIDE 0.5 MG: 1 INJECTION, SOLUTION INTRAMUSCULAR; INTRAVENOUS; SUBCUTANEOUS at 13:37

## 2021-06-20 RX ADMIN — MIDAZOLAM HYDROCHLORIDE 2 MG: 1 INJECTION, SOLUTION INTRAMUSCULAR; INTRAVENOUS at 12:18

## 2021-06-20 RX ADMIN — FENTANYL CITRATE 100 MCG: 50 INJECTION, SOLUTION INTRAMUSCULAR; INTRAVENOUS at 12:20

## 2021-06-20 RX ADMIN — DEXAMETHASONE SODIUM PHOSPHATE 4 MG: 10 INJECTION, SOLUTION INTRAMUSCULAR; INTRAVENOUS at 12:42

## 2021-06-20 RX ADMIN — HEPARIN SODIUM 5000 UNITS: 5000 INJECTION INTRAVENOUS; SUBCUTANEOUS at 22:44

## 2021-06-20 RX ADMIN — OXYCODONE HYDROCHLORIDE 5 MG: 5 TABLET ORAL at 20:40

## 2021-06-20 RX ADMIN — HYDROMORPHONE HYDROCHLORIDE 0.5 MG: 1 INJECTION, SOLUTION INTRAMUSCULAR; INTRAVENOUS; SUBCUTANEOUS at 14:06

## 2021-06-20 RX ADMIN — LIDOCAINE HYDROCHLORIDE 50 MG: 10 INJECTION, SOLUTION EPIDURAL; INFILTRATION; INTRACAUDAL at 12:20

## 2021-06-20 RX ADMIN — OXYCODONE HYDROCHLORIDE 5 MG: 5 TABLET ORAL at 15:22

## 2021-06-20 RX ADMIN — DEXTROSE, SODIUM CHLORIDE, AND POTASSIUM CHLORIDE 110 ML/HR: 5; .45; .15 INJECTION INTRAVENOUS at 10:20

## 2021-06-20 RX ADMIN — CEFAZOLIN SODIUM 2000 MG: 2 SOLUTION INTRAVENOUS at 05:38

## 2021-06-20 RX ADMIN — PHENYLEPHRINE HYDROCHLORIDE 200 MCG: 10 INJECTION INTRAVENOUS at 12:41

## 2021-06-20 RX ADMIN — HYDROMORPHONE HYDROCHLORIDE 0.5 MG: 1 INJECTION, SOLUTION INTRAMUSCULAR; INTRAVENOUS; SUBCUTANEOUS at 14:24

## 2021-06-20 RX ADMIN — SUGAMMADEX 100 MG: 100 INJECTION, SOLUTION INTRAVENOUS at 13:17

## 2021-06-20 RX ADMIN — SODIUM CHLORIDE, SODIUM LACTATE, POTASSIUM CHLORIDE, AND CALCIUM CHLORIDE: .6; .31; .03; .02 INJECTION, SOLUTION INTRAVENOUS at 12:18

## 2021-06-20 RX ADMIN — VENLAFAXINE HYDROCHLORIDE 150 MG: 150 CAPSULE, EXTENDED RELEASE ORAL at 15:22

## 2021-06-20 RX ADMIN — ONDANSETRON 4 MG: 2 INJECTION INTRAMUSCULAR; INTRAVENOUS at 12:42

## 2021-06-20 RX ADMIN — ONDANSETRON 4 MG: 2 INJECTION INTRAMUSCULAR; INTRAVENOUS at 13:36

## 2021-06-20 RX ADMIN — KETOROLAC TROMETHAMINE 30 MG: 30 INJECTION, SOLUTION INTRAMUSCULAR at 13:03

## 2021-06-20 RX ADMIN — ROCURONIUM BROMIDE 50 MG: 10 SOLUTION INTRAVENOUS at 12:20

## 2021-06-20 RX ADMIN — PHENYLEPHRINE HYDROCHLORIDE 200 MCG: 10 INJECTION INTRAVENOUS at 12:38

## 2021-06-20 RX ADMIN — HYDROMORPHONE HYDROCHLORIDE 0.5 MG: 1 INJECTION, SOLUTION INTRAMUSCULAR; INTRAVENOUS; SUBCUTANEOUS at 17:13

## 2021-06-20 RX ADMIN — PROPOFOL 200 MG: 10 INJECTION, EMULSION INTRAVENOUS at 12:20

## 2021-06-20 RX ADMIN — OXYCODONE HYDROCHLORIDE 5 MG: 5 TABLET ORAL at 05:37

## 2021-06-20 RX ADMIN — METRONIDAZOLE 500 MG: 500 INJECTION, SOLUTION INTRAVENOUS at 06:36

## 2021-06-20 NOTE — ANESTHESIA PREPROCEDURE EVALUATION
Procedure:  CHOLECYSTECTOMY LAPAROSCOPIC (N/A Abdomen)    Relevant Problems   GI/HEPATIC   (+) GERD (gastroesophageal reflux disease)      NEURO/PSYCH   (+) Anxiety        Physical Exam    Airway    Mallampati score: II  TM Distance: >3 FB  Neck ROM: full     Dental   No notable dental hx     Cardiovascular  Cardiovascular exam normal    Pulmonary  Pulmonary exam normal     Other Findings        Anesthesia Plan  ASA Score- 2     Anesthesia Type- general with ASA Monitors  Additional Monitors:   Airway Plan: ETT  Plan Factors-Exercise tolerance (METS): >4 METS  Chart reviewed  Existing labs reviewed  Patient summary reviewed  Patient is not a current smoker  Patient not instructed to abstain from smoking on day of procedure  Patient did not smoke on day of surgery  Induction- intravenous  Postoperative Plan- Plan for postoperative opioid use  Informed Consent- Anesthetic plan and risks discussed with patient  I personally reviewed this patient with the CRNA  Discussed and agreed on the Anesthesia Plan with the MARI Arzola

## 2021-06-20 NOTE — UTILIZATION REVIEW
Initial Clinical Review    Admission: Date/Time/Statement:   Admission Orders (From admission, onward)     Ordered        06/19/21 1926  Inpatient Admission  Once                   Orders Placed This Encounter   Procedures    Inpatient Admission     Standing Status:   Standing     Number of Occurrences:   1     Order Specific Question:   Level of Care     Answer:   Med Surg [16]     Order Specific Question:   Estimated length of stay     Answer:   More than 2 Midnights     Order Specific Question:   Certification     Answer:   I certify that inpatient services are medically necessary for this patient for a duration of greater than two midnights  See H&P and MD Progress Notes for additional information about the patient's course of treatment  ED Arrival Information     Expected Arrival Acuity    - 6/19/2021 16:32 Urgent         Means of arrival Escorted by Service Admission type    Walk-In Family [facility] Surgery-General Urgent         Arrival complaint    Abdominal Pain        Chief Complaint   Patient presents with    Abdominal Pain     pt presents with lower right sided abdominal pain since last Monday, states was seen at Urgent Care and had bloodwork done and also had ultrasound done, awaiting results, denies n/v        Initial Presentation:  26 yo female presented to ED from home as inpatient admission for acute calculous cholecystitis  Patient c/o RUQ pain that increases with movement & eating x 5 days  Using motrin at home with little relief  On exam (+) tenderness RUQ and abdominal tenderness noted  (+) sylvester sign  Plan NPO,IVF,IV antibx, pain and nausea medication as needed with supportive care    OR 06-20-21        ED Triage Vitals   Temperature Pulse Respirations Blood Pressure SpO2   06/19/21 1638 06/19/21 1638 06/19/21 1638 06/19/21 1638 06/19/21 1638   98 1 °F (36 7 °C) 104 18 146/83 99 %      Temp Source Heart Rate Source Patient Position - Orthostatic VS BP Location FiO2 (%)   06/19/21 1638 06/19/21 1638 06/19/21 1815 06/19/21 1815 --   Oral Monitor Lying Left arm       Pain Score       06/19/21 1813       5          Wt Readings from Last 1 Encounters:   06/19/21 92 1 kg (203 lb)     Additional Vital Signs:   Date/Time  Temp  Pulse  Resp  BP  MAP (mmHg)  SpO2  O2 Device  Patient Position - Orthostatic VS   06/20/21 0700  98 2 °F (36 8 °C)  76  14  102/59  --  95 %  None (Room air)  Lying   06/19/21 2326  98 3 °F (36 8 °C)  84  18  132/91  --  98 %  None (Room air)  Lying   06/19/21 1815  --  96  18  146/88  108  100 %  None (Room air)  Lying       Pertinent Labs/Diagnostic Test Results:       Results from last 7 days   Lab Units 06/19/21  1738   WBC Thousand/uL 10 50*   HEMOGLOBIN g/dL 13 2   HEMATOCRIT % 41 0   PLATELETS Thousands/uL 432*   NEUTROS ABS Thousands/µL 8 31*         Results from last 7 days   Lab Units 06/20/21  0447 06/19/21  1738   SODIUM mmol/L 139 140   POTASSIUM mmol/L 4 1 3 4*   CHLORIDE mmol/L 107 103   CO2 mmol/L 24 27   ANION GAP mmol/L 8 10   BUN mg/dL 8 10   CREATININE mg/dL 0 68 0 88   EGFR ml/min/1 73sq m 123 92   CALCIUM mg/dL 8 3 8 9     Results from last 7 days   Lab Units 06/20/21  0447 06/19/21  1738   AST U/L 17 11   ALT U/L 14 18   ALK PHOS U/L 103 112   TOTAL PROTEIN g/dL 6 7 7 6   ALBUMIN g/dL 2 5* 2 9*   TOTAL BILIRUBIN mg/dL 0 25 0 21         Results from last 7 days   Lab Units 06/20/21  0447 06/19/21  1738   GLUCOSE RANDOM mg/dL 108 124       Results from last 7 days   Lab Units 06/19/21  1738   LIPASE u/L 114     Results from last 7 days   Lab Units 06/19/21  1752   CLARITY UA  Clear   COLOR UA  Yellow   SPEC GRAV UA  >=1 030   PH UA  5 5   GLUCOSE UA mg/dl Negative   KETONES UA mg/dl Trace*   BLOOD UA  Negative   PROTEIN UA mg/dl Negative   NITRITE UA  Negative   BILIRUBIN UA  Negative   UROBILINOGEN UA E U /dl 0 2   LEUKOCYTES UA  Negative       US abdomen  06-19-21     Cholelithiasis without evidence of acute cholecystitis       ED Treatment:   Medication Administration from 06/19/2021 1632 to 06/19/2021 2022       Date/Time Order Dose Route Action     06/19/2021 1813 sodium chloride 0 9 % bolus 1,000 mL 1,000 mL Intravenous New Bag     06/19/2021 1813 ketorolac (TORADOL) injection 15 mg 15 mg Intravenous Given        History reviewed  No pertinent past medical history  Present on Admission:   Acute calculous cholecystitis      Admitting Diagnosis: Cholelithiasis [K80 20]  Abdominal pain [R10 9]  Acute calculous cholecystitis [K80 00]  Age/Sex: 27 y o  female  Admission Orders:  Scheduled Medications:  cefazolin, 2,000 mg, Intravenous, Q8H  heparin (porcine), 5,000 Units, Subcutaneous, Q8H ALFREDA  metroNIDAZOLE, 500 mg, Intravenous, Q8H  venlafaxine, 150 mg, Oral, Daily      Continuous IV Infusions:  dextrose 5 % and sodium chloride 0 45 % with KCl 20 mEq/L, 110 mL/hr, Intravenous, Continuous      PRN Meds:  acetaminophen, 650 mg, Oral, Q6H PRN  HYDROmorphone, 0 5 mg, Intravenous, Q3H PRN  ondansetron, 4 mg, Intravenous, Q6H PRN  oxyCODONE, 5 mg, Oral, Q4H PRN    X 1    SCD  NPO          Network Utilization Review Department  ATTENTION: Please call with any questions or concerns to [phone] and carefully listen to the prompts so that you are directed to the right person  All voicemails are confidential   [facility] all requests for admission clinical reviews, approved or denied determinations and any other requests to dedicated fax number below belonging to the campus where the patient is receiving treatment   List of dedicated fax numbers for the Facilities:  [address] DENIALS (Administrative/Medical Necessity) [phone]   [address] [place] [address] (Maternity/NICU/Pediatrics) 261 [facility],[address] [place] [address] [facility] [address] [address] [place] [address] Mikayla Dominik [address] [facility] [phone]

## 2021-06-20 NOTE — ANESTHESIA POSTPROCEDURE EVALUATION
Post-Op Assessment Note    CV Status:  Stable    Pain management: adequate     Mental Status:  Alert and awake   Hydration Status:  Euvolemic   PONV Controlled:  Controlled   Airway Patency:  Patent      Post Op Vitals Reviewed: Yes      Staff: CRNA         No complications documented      BP   127/68   Temp   97 5   Pulse  94   Resp   18   SpO2   99

## 2021-06-20 NOTE — PROGRESS NOTES
Progress Note - General Surgery   Karimeher Portillo 27 y o  female MRN: [id number]  Unit/Bed#: S -01 Encounter: [id number]    Assessment:  82-year-old female with acute calculous cholecystitis    Plan:  OR today for lap cedric  NPO  MIVF  Continue Ancef/Flagyl  PPX:  SQH  Pain and nausea control PRN  Anxiety POA: Continue home Effexor      Subjective/Objective     Subjective: No acute events  Some improvement in abdominal pain  Afebrile  Objective:   Blood pressure 102/59, pulse 76, temperature 98 2 °F (36 8 °C), temperature source Oral, resp  rate 14, height 5' 2" (1 575 m), weight 92 1 kg (203 lb), SpO2 95 %, not currently breastfeeding  ,Body mass index is 37 13 kg/m²      No intake or output data in the 24 hours ending 06/20/21 0806    Invasive Devices     Peripheral Intravenous Line            Peripheral IV 06/19/21 Left Antecubital <1 day                Physical Exam:   Gen: NAD, Comfortable, Morbidly obese  Neuro: A&O, No focal deficits  Head: Normal Cephalic, Atraumatic  Eye: EOMI, PERRLA, No scleral icterus  Neck: Supple, No JVD, Midline trachea  Chest: Normal work of breathing, no respiratory distress  Abd: Soft, Non-Distended, Tender in RUQ  Ext: No edema, Non-tender  Skin: warm, dry, intact      Lab, Imaging and other studies:  Recent Labs     06/19/21  1738   WBC 10 50*   HGB 13 2   *     Recent Labs     06/19/21  1738 06/20/21  0447   SODIUM 140 139   K 3 4* 4 1    107   CO2 27 24   BUN 10 8   CREATININE 0 88 0 68   CALCIUM 8 9 8 3             VTE Pharmacologic Prophylaxis: Heparin  VTE Mechanical Prophylaxis: sequential compression device

## 2021-06-20 NOTE — OP NOTE
OPERATIVE REPORT  PATIENT NAME: Kaya Villareal    :  1995  MRN: [id number]  Pt Location: [facility] OR ROOM 04    SURGERY DATE: 2021    Surgeon(s) and Role:     * Quinn Ott, DO - Primary     * Richard Jack DO - Assisting    Preop Diagnosis:  Acute calculous cholecystitis [K80 00]    Post-Op Diagnosis Codes:     * Acute calculous cholecystitis [K80 00]    Procedure(s) (LRB):  CHOLECYSTECTOMY LAPAROSCOPIC (N/A)    Specimen(s):  ID Type Source Tests Collected by Time Destination   1 :  Tissue Gallbladder TISSUE EXAM Quinn Fernandez DO 2021 1304        Estimated Blood Loss:   Minimal    Drains:  * No LDAs found *    Anesthesia Type:   General/local    Operative Indications:  Acute calculous cholecystitis [K80 00]      Operative Findings:  Changes consistent with acute calculous cholecystitis  Height 62 in weight 92 kg/203 lb  BMI 37  ASA 2  Wound class 2    Complications:   None    Procedure and Technique:  Patient was brought the operative suite and identified by visualization, conversation, by armband  Sequential compression pumps were placed  She was given Ancef perioperatively  Once under general anesthesia abdomen is then prepped and draped in a sterile fashion  Time-out was performed was assured prep was dry  Local was instilled at the infraumbilical fold  Small vertical skin incision was made the subcutaneous tissues were bluntly dissected with Kocher clamps the fascia  Fascia lifted up and divided the midline  Poked through the underlying peritoneum gaining access into the abdominal cavity  11 mm trocar was placed under direct visualization  CO2 was attached creating pneumoperitoneum 3 other 5 mm bladeless trocars were then placed in the right upper quadrant  Gallbladder was lifted cephalad  This is distended with a small amount of edema noted  Lateral retraction was utilized on the gallbladder neck to help us with dissecting out both cystic duct and cystic artery structures    Once these were clearly identified these were divided to Ligaclips  Gallbladder was then taken off the liver using variable speed Harmonic scalpel  There was good hemostasis  Gallbladder was placed into an Endo-Catch bag  Irrigation is carried out  Pneumoperitoneum was evacuated  Gallbladder was brought to the umbilical port site  Trocars removed releasing pneumoperitoneum fascia at the umbilical port site was closed using 0 Vicryl in a figure-of-eight fashion  Local was instilled  Irrigation is carried out  Four Monocryl was used to close skin incisions in a subcuticular fashion  Wounds washed and dried  Sterile skin glue was applied  She was awake in the operating room and returned to the recovery area in stable condition having tolerated procedure well     I was present for the entire procedure    Patient Disposition:  PACU     SIGNATURE: Carmen Camejo DO  DATE: June 20, 2021  TIME: 1:05 PM

## 2021-06-21 VITALS
SYSTOLIC BLOOD PRESSURE: 111 MMHG | WEIGHT: 203 LBS | DIASTOLIC BLOOD PRESSURE: 59 MMHG | HEART RATE: 85 BPM | BODY MASS INDEX: 37.36 KG/M2 | TEMPERATURE: 98 F | HEIGHT: 62 IN | RESPIRATION RATE: 15 BRPM | OXYGEN SATURATION: 97 %

## 2021-06-21 PROCEDURE — NC001 PR NO CHARGE: Performed by: SURGERY

## 2021-06-21 RX ADMIN — VENLAFAXINE HYDROCHLORIDE 150 MG: 150 CAPSULE, EXTENDED RELEASE ORAL at 09:43

## 2021-06-21 RX ADMIN — OXYCODONE HYDROCHLORIDE 5 MG: 5 TABLET ORAL at 05:54

## 2021-06-21 RX ADMIN — HEPARIN SODIUM 5000 UNITS: 5000 INJECTION INTRAVENOUS; SUBCUTANEOUS at 05:54

## 2021-06-21 NOTE — PROGRESS NOTES
Progress Note - General Surgery   Nayeli Hughes 27 y o  female MRN: [id number]  Unit/Bed#: S -01 Encounter: [id number]    Assessment:  26-year-old female with acute cholecystitis status post laparoscopic cholecystectomy on 06/20    Plan:  -Diet:  Soft diet as tolerated  -I/Os  -DVT ppx  -Control Pain/Nausea   -OOB as tolerated, PT/OT  -Incentive Spirometry  -patient has no complaint of nausea and pain will continue to monitor plan for discharge tomorrow if patient improves        Subjective/Objective       Subjective:  Patient seen and examined postoperatively  Patient complains of mild right upper quadrant pain and some nausea  Objective:    Blood pressure 140/84, pulse 70, temperature 97 7 °F (36 5 °C), temperature source Oral, resp  rate 14, height 5' 2" (1 575 m), weight 92 1 kg (203 lb), SpO2 99 %, not currently breastfeeding  ,Body mass index is 37 13 kg/m²  Intake/Output Summary (Last 24 hours) at 6/20/2021 2132  Last data filed at 6/20/2021 1325  Gross per 24 hour   Intake 700 ml   Output --   Net 700 ml       Invasive Devices     Peripheral Intravenous Line            Peripheral IV 06/19/21 Left Antecubital 1 day                Physical Exam:  General: NAD  Head: normocephalic, atraumatic  CV: Pulse regular  Lungs: no conversational dyspnea  Abdomen:  Soft, nondistended, tender palpation right upper quadrant near incisions, incisions are clean dry intact  No guarding rebound or rigidity  Extremities: PERKINS, motor sensory intact  Neuro: awake, alert, answers questions appropriately      Lab, Imaging and other studies:I have personally reviewed pertinent lab results      VTE Pharmacologic Prophylaxis: Heparin  VTE Mechanical Prophylaxis: sequential compression device

## 2021-06-21 NOTE — UTILIZATION REVIEW
Continued Stay Review    Date: 6/20/2021                          Current Patient Class: inpatient  Current Level of Care: med surg    HPI:26 y o  female initially admitted on 6/19/2021 inpatient due to acute cholecystitis  IV antibiotics, IVF, pain control in progress  Assessment/Plan:   6/20/2021 Patient with continued abdominal pain  On exam tender in RUQ  To continue Ancef and Flagyl, pain and nausea control  Patient taken to surgery  Procedure 6/20/2021:  CHOLECYSTECTOMY LAPAROSCOPIC   Findings - Changes consistent with acute calculous cholecystitis  6/20/2021 Post operatively - Patient is nauseous and has RUQ pain  On exam abdomen tender to palpation RUQ near incisions  Incisions clean dry and intact  Plan is advance diet as tolerated, pain control, OOB  Incentive spirometry       Vital Signs:   06/20/21 2300  98 1 °F (36 7 °C)  70  16  119/74  92  97 %  --  --  --  None (Room air)  Lying   06/20/21 1820  97 7 °F (36 5 °C)  --  --  --  --  --  --  --  --  --  --   06/20/21 1630  98 1 °F (36 7 °C)  70  14  140/84  --  99 %  --  --  --  None (Room air)  Lying   06/20/21 1530  98 °F (36 7 °C)  72  14  142/95  --  99 %  --  --  --  None (Room air)  Lying   06/20/21 1500  98 °F (36 7 °C)  69  14  139/84  --  99 %  24  --  1 L/min  Nasal cannula  Lying   06/20/21 1430  --  74  17  127/74  --  99 %  28  --  2 L/min  Nasal cannula  --   06/20/21 1415  98 °F (36 7 °C)  74  15  125/79  --  97 %  --  --  --  --  --   06/20/21 1400  --  74  20  121/76  --  97 %  --  2 L/min  --  Nasal cannula  --   06/20/21 1345  --  74  14  119/72  --  95 %  --  2 L/min  --  Nasal cannula  --   06/20/21 1325  97 5 °F (36 4 °C)  77  19  127/68  --  95 %  --  6 L/min  --  Simple mask  --   06/20/21 1110  97 6 °F (36 4 °C)  80  16  127/79  --  98 %  --  --  --  None (Room air)  --   06/20/21 0700  98 2 °F (36 8 °C)  76  14  102/59  --  95 %  --  --  --  None (Room air)           Pertinent Labs/Diagnostic Results:  6/18/2021 US abdomen - Cholelithiasis without evidence of acute cholecystitis    Results from last 7 days   Lab Units 06/19/21  1738   WBC Thousand/uL 10 50*   HEMOGLOBIN g/dL 13 2   HEMATOCRIT % 41 0   PLATELETS Thousands/uL 432*   NEUTROS ABS Thousands/µL 8 31*     Results from last 7 days   Lab Units 06/20/21  0447 06/19/21  1738   SODIUM mmol/L 139 140   POTASSIUM mmol/L 4 1 3 4*   CHLORIDE mmol/L 107 103   CO2 mmol/L 24 27   ANION GAP mmol/L 8 10   BUN mg/dL 8 10   CREATININE mg/dL 0 68 0 88   EGFR ml/min/1 73sq m 123 92   CALCIUM mg/dL 8 3 8 9     Results from last 7 days   Lab Units 06/20/21  0447 06/19/21  1738   AST U/L 17 11   ALT U/L 14 18   ALK PHOS U/L 103 112   TOTAL PROTEIN g/dL 6 7 7 6   ALBUMIN g/dL 2 5* 2 9*   TOTAL BILIRUBIN mg/dL 0 25 0 21     Results from last 7 days   Lab Units 06/20/21  0447 06/19/21  1738   GLUCOSE RANDOM mg/dL 108 124     Results from last 7 days   Lab Units 06/19/21  1738   LIPASE u/L 114     Results from last 7 days   Lab Units 06/19/21  1752   CLARITY UA  Clear   COLOR UA  Yellow   SPEC GRAV UA  >=1 030   PH UA  5 5   GLUCOSE UA mg/dl Negative   KETONES UA mg/dl Trace*   BLOOD UA  Negative   PROTEIN UA mg/dl Negative   NITRITE UA  Negative   BILIRUBIN UA  Negative   UROBILINOGEN UA E U /dl 0 2   LEUKOCYTES UA  Negative       Medications:   Scheduled Medications:  heparin (porcine), 5,000 Units, Subcutaneous, Q8H [address]  venlafaxine, 150 mg, Oral, Daily    ceFAZolin (ANCEF) IVPB (premix in dextrose) 2,000 mg 50 mL   Dose: 2,000 mg  Freq: Every 8 hours Route: IV  Last Dose: 2,000 mg (06/20/21 0538)  Start: 06/19/21 1930 End: 06/20/21 1449    metroNIDAZOLE (FLAGYL) IVPB (premix) 500 mg 100 mL   Dose: 500 mg  Freq: Every 8 hours Route: IV  Last Dose: 500 mg (06/20/21 0636)  Start: 06/19/21 1930 End: 06/20/21 1449    Continuous IV Infusions:dextrose 5 % and sodium chloride 0 45 % with KCl 20 mEq/L infusion   Rate: 110 mL/hr Dose: 110 mL/hr  Freq: Continuous Route: IV  Last Dose: 110 mL/hr (06/20/21 1020)  Start: 06/19/21 1930 End: 06/20/21 2113     PRN Meds:  acetaminophen, 650 mg, Oral, Q6H PRN  HYDROmorphone, 0 5 mg, Intravenous, Q1H PRN - used x 1 post operatively 6/20/2021  ondansetron, 4 mg, Intravenous, Q6H PRN- used x 1 6/20/2021 post operatively  oxyCODONE, 5 mg, Oral, Q4H PRN - used x 2 6/20        Discharge Plan: To be determined     Network Utilization Review Department  ATTENTION: Please call with any questions or concerns to [phone] and carefully listen to the prompts so that you are directed to the right person  All voicemails are confidential   Theresa Tommy all requests for admission clinical reviews, approved or denied determinations and any other requests to dedicated fax number below belonging to the campus where the patient is receiving treatment   List of dedicated fax numbers for the Facilities:  [address] DENIALS (Administrative/Medical Necessity) [phone]   [address] (Maternity/NICU/Pediatrics) [phone]   [address] [place] [address] [address] [place] [phone] [place] [address] Mikayla Palacios [address] [facility] [facility] [phone]

## 2021-06-21 NOTE — PROGRESS NOTES
Progress Note - General Surgery   Hazel Ballard 27 y o  female MRN: [id number]  Unit/Bed#: S -01 Encounter: [id number]    Assessment:  72-year-old female with acute cholecystitis status post laparoscopic cholecystectomy on 06/20    Plan:  -Diet:  Soft diet as tolerated  -I/Os  -DVT ppx  -Control Pain/Nausea   -OOB as tolerated  -Incentive Spirometry  -patient feels much better this morning will plan for discharge later today  Subjective/Objective       Subjective:  Patient seen examined at bedside  States her nausea has improved significantly  Pain is also improved  Denies any fevers or chills  Tolerated soft diet last night  Objective:    Blood pressure 111/59, pulse 85, temperature 98 °F (36 7 °C), temperature source Oral, resp  rate 15, height 5' 2" (1 575 m), weight 92 1 kg (203 lb), SpO2 97 %, not currently breastfeeding  ,Body mass index is 37 13 kg/m²  Intake/Output Summary (Last 24 hours) at 6/21/2021 0851  Last data filed at 6/20/2021 1325  Gross per 24 hour   Intake 700 ml   Output --   Net 700 ml       Invasive Devices     Peripheral Intravenous Line            Peripheral IV 06/19/21 Left Antecubital 1 day                Physical Exam:  General: NAD  Head: normocephalic, atraumatic  CV: Pulse regular  Lungs: no conversational dyspnea  Abdomen:  Soft, nondistended, tender palpation right upper quadrant near incisions, incisions are clean dry intact  No guarding rebound or rigidity  Extremities: PERKINS, motor sensory intact  Neuro: awake, alert, answers questions appropriately      Lab, Imaging and other studies:I have personally reviewed pertinent lab results      VTE Pharmacologic Prophylaxis: Heparin  VTE Mechanical Prophylaxis: sequential compression device

## 2021-06-21 NOTE — DISCHARGE INSTRUCTIONS
Please call the office when you leave to schedule an appointment for 2 weeks  This can be a virtual / telephone consultation or it can be in person  The choice is yours  Please call [phone]   [address], [address] [address]  Off of [address] between [facility] and [facility]  Activity:    May lift 10 lb as many times as desired the 1st week,       20 lb in 2 weeks,       30 lb in 3 weeks  Walking is encouraged  Normal daily activities including climbing steps are okay  Do not engage in strenuous activity ( sit-ups or crutches) or contact sports for 4-6 weeks post-operatively    Return to Work:   Okay to return to work when you feel well if you desire  Diet:   You may return to your normal healthy diet  Wound Care: Your wound is closed with dissolvable stitches and glue  It is okay to shower  Wash incision gently with soap and water and pat dry  Do not soak incisions in bath water or swim for two weeks  Do not apply any creams or ointments  Pain Medication:   Please take as directed if needed  May use Advil or Motrin in addition  Recall, the pain medicine and anesthesia is associated with constipation  No driving while taking narcotic pain medications  Other: It is normal to developed a healing ridge / firm incision after surgery  This is your body making scar tissue  It is a good sign  Constipation is very common after general anesthesia  Please use milk of magnesia as needed in order to help prevent constipation  It is normal to get bruising after surgery  If you have questions after discharge please call the office  If you have increased pain, fever >101 5, increased drainage, redness or a bad smell at your surgery site, please call us immediately or come directly to the Emergency Room            Laparoscopic Cholecystectomy    WHAT YOU SHOULD KNOW:    Cholecystitis is inflammation of your gallbladder   Your gallbladder stores bile, which helps break down the fat that you eat  It also helps remove certain chemicals from your body  You may have a sudden, severe attack (acute cholecystitis) or several mild attacks (chronic cholecystitis)  Laparoscopic cholecystectomy is surgery to remove your gallbladder  During this surgery, small incisions are made in your abdomen  A small scope and special tools are inserted through these incisions  Your gallbladder is removed from it normal location and taken out of your abdomen  The incisions are closed with sutures and a small amount of glue is applied over top incisions to help reinforce the incisions to optimize healing          AFTER YOU LEAVE:  Following discharge from the hospital, you may have some questions about your procedure, your activities or your general condition  These instructions may answer some of your questions and help you adjust during the first few days following your operation  You can expect to be sore and tender mostly around the incisions  This pain should last approximally 5 days and gradually improve daily  Incisions: Your doctor may have chosen to use a type of adhesive glue, to close your incision  The glue is used to cover the incision, assist in closure, and prevent contamination so to optimize healing  This adhesive glue will darken and may appear as a purple film over the incision  Do not pick at the glue, it will peel away on its own within one to two weeks  You may apply ice to the incisions to help with pain  Avoid heat as this my make the glue tacky   It is normal to have some bruising, swelling or mild discoloration around the incision  If increasing redness or pain develops, call our office immediately  You may wash the incision gently with soap and water then pat dry  Do not apply any creams or ointments  Dressings: You do not usually need to keep incisions covered with a dressing    A dressing is only required if you had a drain following your surgery and the drain was removed prior to discharge  If a dressing is required the doctor will discuss the dressing with prior to leaving  You may remove the dressing for showering, but reapply when dry  Bathing: You may shower daily with soap and water the day after the procedure  It is OK to GENTLY wash the incision with soap and water then pat dry  Do NOT soak incision in a tub, pool, or hot tub for 2 weeks  Diet: Eat low-fat foods for 4 to 6 weeks while your body learns to digest fat without a gallbladder  Slowly increase the amount of fat that you eat  We recommend you slowly advance your diet  Try to start with softer bland foods and gradually advance as tolerated  Be sure to consume plenty of water  Avoid alcohol  Activity/Restrictions: The evening following the procedure you should rest as much as possible, sitting, lying or reclining  you should be sure someone remains with you until the next morning  Gradually increase your activity daily  Walking 3-4 daily is good and  stairs are ok  Listen to your body  If you start to get tired or sore then rest    No strenuous activity or exercise for 3-4 weeks  No driving for 5 days or while taking narcotics for pain  Return or work: You may return to work or other activities as soon as your pain is controlled and you feel comfortable  For many people, this is 5 to 7 days after surgery  If your job requires heavy lifting you will need to be on light duty for 2-3 weeks  Follow up appointment: following discharge from the hospital call the office in 1-2 days to set up a post operative appointment to be seen in 2-3 weeks  The phone number and address should be provided in your discharge paper work  Medication: Please take all medications as prescribed  Call your healthcare provider if you think your medicine is not helping or if you have side effects     If you were given a prescription for Percocet, Norco, or Vicodin for pain be sure to eat prior to taking as these medications as they may cause nausea and vomiting on an empty stomach  DO NOT take Tylenol with these medication for a fever or for further pain control as these medications already contain Tylenol in them       Contact your healthcare provider if:   · You have a fever over 101°F (38°C) or chills  · You have pain or nausea that is not relieved by medicine  · You have redness and swelling around your incisions, or blood or pus is leaking from your incisions  · You are constipated or have diarrhea  · Your skin or eyes are yellow, or your bowel movements are pale  · You have questions or concerns about your surgery, condition, or care  Seek care immediately or call 911 if:   · You cannot stop vomiting  · Your bowel movements are black or bloody  · You have pain in your abdomen and it is swollen or hard  · Your arm or leg feels warm, tender, and painful  It may look swollen and red  · You feel lightheaded, short of breath, and have chest pain  · You cough up blood

## 2021-07-01 ENCOUNTER — OFFICE VISIT (OUTPATIENT)
Dept: SURGERY | Facility: CLINIC | Age: 26
End: 2021-07-01

## 2021-07-01 DIAGNOSIS — K80.00 ACUTE CALCULOUS CHOLECYSTITIS: Primary | ICD-10-CM

## 2021-07-01 PROCEDURE — 99024 POSTOP FOLLOW-UP VISIT: CPT | Performed by: SURGERY

## 2021-07-01 NOTE — PROGRESS NOTES
Assessment/Plan:    Diagnoses and all orders for this visit:    Acute calculous cholecystitis     doing well status post laparoscopic cholecystectomy  May resume diet and activity as tolerated    Pathology: Reviewed with patient, all questions answered  Postoperative restrictions reviewed  All questions answered  ______________________________________________________  HPI: Patient presents post operatively  Laparoscopic cholecystectomy 6/20/21   Final Diagnosis  A  Gallbladder (cholecystectomy):  - Cholelithiasis, mild chronic cholecystitis  - Cholesterolosis  - Negative for dysplasia                  ROS:  General ROS: negative for - chills, fatigue, fever or night sweats, weight loss  Respiratory ROS: no cough, shortness of breath, or wheezing  Cardiovascular ROS: no chest pain or dyspnea on exertion  Genito-Urinary ROS: no dysuria, trouble voiding, or hematuria  Musculoskeletal ROS: negative for - gait disturbance, joint pain or muscle pain  Neurological ROS: no TIA or stroke symptoms  GI ROS: see HPI  Skin ROS: no new rashes or lesions   Lymphatic ROS: no new adenopathy noted by pt  GYN ROS: see HPI, no new GYN history or bleeding noted  Psy ROS: no new mental or behavioral disturbances         Patient Active Problem List   Diagnosis    Anxiety    GERD (gastroesophageal reflux disease)    Acute calculous cholecystitis       Allergies:  Patient has no known allergies  Current Outpatient Medications:     clonazePAM (KlonoPIN) 0 5 mg tablet, Take 0 5 mg by mouth 2 (two) times a day as needed for seizures, Disp: , Rfl:     drospirenone-ethinyl estradiol (ROGELIO) 3-0 03 MG per tablet, Take 1 tablet by mouth daily, Disp: , Rfl:     venlafaxine (EFFEXOR-XR) 150 mg 24 hr capsule, Take 150 mg by mouth daily, Disp: , Rfl: 5    No past medical history on file      Past Surgical History:   Procedure Laterality Date    CHOLECYSTECTOMY LAPAROSCOPIC N/A 6/20/2021    Procedure: CHOLECYSTECTOMY LAPAROSCOPIC;  Surgeon: Bobby Reyes DO;  [facility];  Service: General    FOOT TENDON SURGERY Bilateral 2012    Repair of flat feet       Family History   Problem Relation Age of Onset    No Known Problems Mother     No Known Problems Father         reports that she has never smoked  She has never used smokeless tobacco  She reports previous alcohol use  She reports that she does not use drugs  PHYSICAL EXAM    There were no vitals taken for this visit      General: normal, cooperative, no distress  Abdominal: soft, nondistended or nontender  Incision: clean, dry, and intact and healing well      Bobby Reyes DO    Date: 7/1/2021 Time: 4:53 PM

## 2022-01-12 ENCOUNTER — HOSPITAL ENCOUNTER (EMERGENCY)
Facility: HOSPITAL | Age: 27
Discharge: HOME | End: 2022-01-12
Attending: EMERGENCY MEDICINE
Payer: COMMERCIAL

## 2022-01-12 VITALS
RESPIRATION RATE: 20 BRPM | TEMPERATURE: 99 F | SYSTOLIC BLOOD PRESSURE: 135 MMHG | DIASTOLIC BLOOD PRESSURE: 86 MMHG | OXYGEN SATURATION: 96 % | HEART RATE: 90 BPM

## 2022-01-12 DIAGNOSIS — U07.1 COVID-19: Primary | ICD-10-CM

## 2022-01-12 PROCEDURE — 93005 ELECTROCARDIOGRAM TRACING: CPT

## 2022-01-12 PROCEDURE — 99285 EMERGENCY DEPT VISIT HI MDM: CPT | Performed by: EMERGENCY MEDICINE

## 2022-01-12 PROCEDURE — 99284 EMERGENCY DEPT VISIT MOD MDM: CPT

## 2022-01-12 NOTE — ED PROVIDER NOTES
History  Chief Complaint   Patient presents with    Decreased Oxygen Level     Pt reports testing positive for covid on 1/2, has been monitoring oxygen level and was running low aorund 90%, +HA +SOB Denies CP/fevers      Luis Abreu is a 27 y o  female who presents with chief complaint of possible hypoxia  She was recently diagnosed with covid (10 days ago) and has been feeling more short of breath with exertion  She used her home pulse oximeter and had a low reading at home  However upon arrival her pulse oximetry was 96-98%  She has gel nail coverings and had a poor waveform unless the probe was turned sideways which produced a good wave form and the above mentioned readings  History provided by:  Patient   used: No    URI  Presenting symptoms: congestion, cough, fatigue and fever    Severity:  Moderate  Onset quality:  Gradual  Timing:  Constant  Progression:  Unchanged  Chronicity:  New  Relieved by:  Nothing  Worsened by:  Nothing  Ineffective treatments:  None tried  Associated symptoms: myalgias        Prior to Admission Medications   Prescriptions Last Dose Informant Patient Reported? Taking? clonazePAM (KlonoPIN) 0 5 mg tablet   Yes No   Sig: Take 0 5 mg by mouth 2 (two) times a day as needed for seizures   drospirenone-ethinyl estradiol (ROGELIO) 3-0 03 MG per tablet  Self Yes No   Sig: Take 1 tablet by mouth daily   venlafaxine (EFFEXOR-XR) 150 mg 24 hr capsule  Self Yes No   Sig: Take 150 mg by mouth daily      Facility-Administered Medications: None       History reviewed  No pertinent past medical history      Past Surgical History:   Procedure Laterality Date    CHOLECYSTECTOMY LAPAROSCOPIC N/A 6/20/2021    Procedure: CHOLECYSTECTOMY LAPAROSCOPIC;  Surgeon: Kaylah Looney DO;  [facility];  Service: General    FOOT TENDON SURGERY Bilateral 2012    Repair of flat feet       Family History   Problem Relation Age of Onset    No Known Problems Mother     No Known Problems Father      I have reviewed and agree with the history as documented  E-Cigarette/Vaping    E-Cigarette Use Never User      E-Cigarette/Vaping Substances    Nicotine No     THC No     CBD No     Flavoring No     Other No     Unknown No      Social History     Tobacco Use    Smoking status: Never Smoker    Smokeless tobacco: Never Used   Vaping Use    Vaping Use: Never used   Substance Use Topics    Alcohol use: Not Currently    Drug use: No       Review of Systems   Constitutional: Positive for fatigue and fever  Negative for chills and diaphoresis  HENT: Positive for congestion  Respiratory: Positive for cough and shortness of breath  Cardiovascular: Negative for chest pain and palpitations  Gastrointestinal: Negative for diarrhea, nausea and vomiting  Genitourinary: Negative for dysuria and frequency  Musculoskeletal: Positive for myalgias  Skin: Negative for rash  All other systems reviewed and are negative  Physical Exam  Physical Exam  Vitals and nursing note reviewed  Constitutional:       General: She is not in acute distress  Appearance: She is well-developed  HENT:      Head: Normocephalic and atraumatic  Eyes:      Pupils: Pupils are equal, round, and reactive to light  Neck:      Vascular: No JVD  Cardiovascular:      Rate and Rhythm: Normal rate and regular rhythm  Heart sounds: Normal heart sounds  No murmur heard  No friction rub  No gallop  Pulmonary:      Effort: Pulmonary effort is normal  No respiratory distress  Breath sounds: Normal breath sounds  No wheezing or rales  Chest:      Chest wall: No tenderness  Musculoskeletal:         General: No tenderness  Normal range of motion  Cervical back: Normal range of motion  Skin:     General: Skin is warm and dry  Neurological:      General: No focal deficit present  Mental Status: She is alert and oriented to person, place, and time     Psychiatric: Behavior: Behavior normal          Thought Content: Thought content normal          Judgment: Judgment normal          Vital Signs  ED Triage Vitals   Temperature Pulse Respirations Blood Pressure SpO2   01/12/22 1423 01/12/22 1423 01/12/22 1423 01/12/22 1423 01/12/22 1423   99 °F (37 2 °C) (!) 108 20 135/86 98 %      Temp Source Heart Rate Source Patient Position - Orthostatic VS BP Location FiO2 (%)   01/12/22 1423 01/12/22 1502 01/12/22 1423 01/12/22 1423 --   Oral Monitor Sitting Left arm       Pain Score       01/12/22 1423       No Pain           Vitals:    01/12/22 1423 01/12/22 1502   BP: 135/86    Pulse: (!) 108 90   Patient Position - Orthostatic VS: Sitting          Visual Acuity      ED Medications  Medications - No data to display    Diagnostic Studies  Results Reviewed     None                 No orders to display              Procedures  ECG 12 Lead Documentation Only    Date/Time: 1/12/2022 6:56 PM  Performed by: Karin Mojica MD  Authorized by: Karin Mojica MD     Indications / Diagnosis:  Shortness of breath  ECG reviewed by me, the ED Provider: yes    Patient location:  ED  Previous ECG:     Previous ECG:  Unavailable  Interpretation:     Interpretation: abnormal    Rate:     ECG rate:  103    ECG rate assessment: tachycardic    Rhythm:     Rhythm: sinus tachycardia    Ectopy:     Ectopy: none    QRS:     QRS axis:  Normal    QRS intervals:  Normal  Conduction:     Conduction: normal    ST segments:     ST segments:  Normal  T waves:     T waves: inverted      Inverted:  III and aVF             ED Course                               SBIRT 22yo+      Most Recent Value   SBIRT (24 yo +)    In order to provide better care to our patients, we are screening all of our patients for alcohol and drug use  Would it be okay to ask you these screening questions?  Unable to answer at this time Filed at: 01/12/2022 1426                    MDM  Number of Diagnoses or Management Options  COVID-19: new and requires workup  Diagnosis management comments: Patient with covid but without significant hypoxia  She did receive the covid vaccine  She is appropriate for discharge and continued home monitoring with return precautions  An EKG was done during triage  Amount and/or Complexity of Data Reviewed  Clinical lab tests: ordered and reviewed    Patient Progress  Patient progress: stable      Disposition  Final diagnoses:   COVID-19     Time reflects when diagnosis was documented in both MDM as applicable and the Disposition within this note     Time User Action Codes Description Comment    1/12/2022  2:41 PM Serafin Hathaway Add [U07 1] COVID-19       ED Disposition     ED Disposition Condition Date/Time Comment    Discharge Stable Wed Jan 12, 2022  2:41 PM Amita Magaña discharge to home/self care  Follow-up Information     Follow up With Specialties Details Why Bhanu Cruz [address], [address] Nurse Practitioner Schedule an appointment as soon as possible for a visit in 1 week  [address] [address] [address] [phone]            Discharge Medication List as of 1/12/2022  2:41 PM      CONTINUE these medications which have NOT CHANGED    Details   clonazePAM (KlonoPIN) 0 5 mg tablet Take 0 5 mg by mouth 2 (two) times a day as needed for seizures, Historical Med      drospirenone-ethinyl estradiol (Stephen Basque) 3-0 03 MG per tablet Take 1 tablet by mouth daily, Starting Wed 2/5/2020, Until Sat 6/19/2021, Historical Med      venlafaxine (EFFEXOR-XR) 150 mg 24 hr capsule Take 150 mg by mouth daily, Starting Thu 6/7/2018, Historical Med             No discharge procedures on file      PDMP Review       Value Time User    PDMP Reviewed  Yes 6/21/2021  9:50 AM Darren Douglass PA-C          ED Provider  Electronically Signed by           Jessica Mccollmu MD  01/12/22 9226 JANETTE Cruz MD  01/12/22 9026

## 2022-01-13 LAB
ATRIAL RATE: 107 BPM
P AXIS: 54 DEGREES
PR INTERVAL: 136 MS
QRS AXIS: 65 DEGREES
QRSD INTERVAL: 86 MS
QT INTERVAL: 308 MS
QTC INTERVAL: 404 MS
T WAVE AXIS: 7 DEGREES
VENTRICULAR RATE: 103 BPM

## 2022-01-13 PROCEDURE — 93010 ELECTROCARDIOGRAM REPORT: CPT | Performed by: INTERNAL MEDICINE

## 2022-09-02 ENCOUNTER — TELEPHONE (OUTPATIENT)
Dept: GASTROENTEROLOGY | Facility: CLINIC | Age: 27
End: 2022-09-02

## 2022-09-02 NOTE — TELEPHONE ENCOUNTER
Left message for patient for correct insurance information [employer] is secondary insurance, [employer]  1/15/22

## 2024-11-25 ENCOUNTER — TELEPHONE (OUTPATIENT)
Age: 29
End: 2024-11-25

## 2024-11-25 NOTE — TELEPHONE ENCOUNTER
Patient has been added to the Medication Management and Talk Therapy wait list without a referral.    Insurance: [employer]  Insurance Type:    Commercial [x]   Medicaid []   [facility] (if applicable)   Medicare []  Location Preference: anywhere  Provider Preference: non   Virtual: Yes [] No [x]  Were outside resources sent: Yes [] No [x]

## 2024-12-27 ENCOUNTER — TELEMEDICINE (OUTPATIENT)
Dept: OTHER | Facility: HOSPITAL | Age: 29
End: 2024-12-27

## 2024-12-27 VITALS — TEMPERATURE: 98.6 F

## 2024-12-27 DIAGNOSIS — K52.9 GASTROENTERITIS: Primary | ICD-10-CM

## 2024-12-27 RX ORDER — ONDANSETRON 4 MG/1
4 TABLET, FILM COATED ORAL EVERY 8 HOURS PRN
Qty: 6 TABLET | Refills: 0 | Status: SHIPPED | OUTPATIENT
Start: 2024-12-27 | End: 2024-12-29

## 2024-12-27 NOTE — PATIENT INSTRUCTIONS
"This is most likely viral gastroenteritis. Zofran as prescribed to help prevent any further dehydration. If you are unable to keep fluids down go to ER.    Rest and drink extra fluids.  Increase clear liquids such as sports drinks or Gatorade.  Advance to bland foods. Follow up with PCP if no improvement.  Go to ER with any worsening symptoms, abd pain or vomiting.     Patient Education     Viral gastroenteritis in adults   The Basics   Written by the doctors and editors at [facility]   What is viral gastroenteritis? -- Viral gastroenteritis is an infection that can cause diarrhea and vomiting. It happens when a person's stomach and intestines get infected with a virus (figure 1). One of the most common causes of gastroenteritis is norovirus. But other viruses can cause it, too.  People can get viral gastroenteritis if they:   Touch an infected person or a surface with the virus on it, and then don't wash their hands   Eat foods or drink liquids with the virus in them. If people with the virus don't wash their hands, they can spread it to food or liquids they touch.  What are the symptoms of viral gastroenteritis? -- The infection causes diarrhea and vomiting. People can have either diarrhea or vomiting, or both. These symptoms usually start suddenly, and can be severe.  Viral gastroenteritis can also cause:   Fever   Headache or muscle aches   Belly pain or cramping   Loss of appetite  If you have a lot of diarrhea and vomiting, your body can lose too much water. This is known as \"dehydration.\" Dehydration can make you feel thirsty, tired, dizzy, or even confused. It can also make your urine look dark yellow.  Severe dehydration can be life-threatening. Older people are more likely to get severe dehydration.  Will I need tests? -- Not usually. Your doctor or nurse should be able to tell if you have viral gastroenteritis by learning about your symptoms and doing an exam. But the doctor or nurse might do tests to check " "for dehydration or to see which virus is causing the infection. These tests can include:   Blood tests   Urine tests   Tests on a sample of bowel movement  Is there anything I can do on my own to feel better? -- Yes. You need to replace the body's fluids that are lost through vomiting and diarrhea:   Drink fluids when you are able. It might help to take small sips every 15 to 30 minutes. Try to drink more as you start to feel better.   When you have a lot of vomiting or diarrhea, your body loses both water and salt. Drinking fluids that contain some salt can help replace what your body has lost. Examples include \"oral rehydration solutions,\" sports drinks, and broth. If you drink a lot of plain water, make sure you are also eating. This will help your body keep the right salt and water balance.   Avoid drinks with a lot of sugar, like juice or soda. Avoid alcohol, too.   Eat when you are able. If you can keep food down, it's best to eat lean meats, fruits, vegetables, and whole-grain breads and cereals. Avoid eating foods with a lot of fat or sugar, which can make symptoms worse.  If you are an adult younger than 65 and you have a new bout of diarrhea, and no fever and no blood in your bowel movements, you can take medicine to stop diarrhea such as loperamide (brand name: Imodium) for 1 to 2 days. But if you are older than 65, have a fever, or have blood in your bowel movements, do not take these medicines without checking with your doctor.  If you have diabetes, you might need to check your blood sugar more often until you feel better. Ask your doctor or nurse about this.  Should I call the doctor or nurse? -- Call the doctor or nurse if you:   Have any symptoms of dehydration, like feeling very tired, thirsty, dizzy, or confused   Have diarrhea or vomiting that lasts longer than a few days   Vomit up blood, have bloody diarrhea, or have severe belly pain   Haven't been able to drink anything for many hours   " "Haven't needed to urinate in the past 6 to 8 hours (during the day)  How is viral gastroenteritis treated? -- Most people do not need any treatment, because their symptoms will get better on their own. But people with severe dehydration might need treatment in the hospital. This involves getting fluids through a thin tube that goes into a vein, called an \"IV.\"  Doctors do not treat viral gastroenteritis with antibiotics. That's because antibiotics treat infections that are caused by bacteria, not viruses.  Can viral gastroenteritis be prevented? -- Sometimes. To lower the chance of getting or spreading the infection, wash your hands well with soap and water:   After you use the bathroom   Before you eat   Before you prepare food  Also, if you are caring for a child in diapers, wash your hands well after changing diapers. Do not change diapers near where you cook or eat food.  All topics are updated as new evidence becomes available and our peer review process is complete.  This topic retrieved from [employer] on: Mar 06, 2024.  Topic 65214 Version 17.0  Release: 32.2.4 - C32.64  © 2024 UpToDate, Inc. and/or its affiliates. All rights reserved.  figure 1: Digestive system     This drawing shows the organs in the body that process food. Together, these organs are called the \"digestive system\" or \"digestive tract.\" As food travels through this system, the body absorbs nutrients and water.  Graphic 75030 Version 5.0  Consumer Information Use and Disclaimer   Disclaimer: This generalized information is a limited summary of diagnosis, treatment, and/or medication information. It is not meant to be comprehensive and should be used as a tool to help the user understand and/or assess potential diagnostic and treatment options. It does NOT include all information about conditions, treatments, medications, side effects, or risks that may apply to a specific patient. It is not intended to be medical advice or a substitute for the " medical advice, diagnosis, or treatment of a health care provider based on the health care provider's examination and assessment of a patient's specific and unique circumstances. Patients must speak with a health care provider for complete information about their health, medical questions, and treatment options, including any risks or benefits regarding use of medications. This information does not endorse any treatments or medications as safe, effective, or approved for treating a specific patient. UpToDate, Inc. and its affiliates disclaim any warranty or liability relating to this information or the use thereof.The use of this information is governed by the Terms of Use, available at https://www.[employer].com/en/know/clinical-effectiveness-terms. 2024© UpToDate, Inc. and its affiliates and/or licensors. All rights reserved.  Copyright   © 2024 UpToDate, Inc. and/or its affiliates. All rights reserved.

## 2024-12-27 NOTE — PROGRESS NOTES
Virtual Regular Visit  Name: Karoline Daigle      : 1995      MRN: [id number]  Encounter Provider: FERNANDO Landrum  Encounter Date: 2024   Encounter department: VIRTUAL CARE       Verification of patient location:  Patient is located at Home in the following state in which I hold an active license [place] :  Assessment & Plan  Gastroenteritis    Orders:    ondansetron (ZOFRAN) 4 mg tablet; Take 1 tablet (4 mg total) by mouth every 8 (eight) hours as needed for nausea or vomiting for up to 2 days        Encounter provider FERNANDO Landrum    The patient was identified by name and date of birth. Karoline Daigle was informed that this is a telemedicine visit and that the visit is being conducted through the Epic Embedded platform. She agrees to proceed..  My office door was closed. No one else was in the room.  She acknowledged consent and understanding of privacy and security of the video platform. The patient has agreed to participate and understands they can discontinue the visit at any time.    Patient is aware this is a billable service.     History was obtained from: History obtained from: patient  History of Present Illness     This is a 29 year old female here today for video visit.  She states she started yesterday with nausea, vomiting and diarrhea.  She states she is having ovary pain.  She has been trying to drink and eat but is vomiting.  She states she is having mild pain with palpation in her abd 3/10 she states the pain is located under right rib.  She states the pain has been there since yesterday.  NO fevers but she has felt feverish.  She states pain is worse after eating and drinking.  She states she had her gallbladder removed.  She states she is getting a little dehydrated and lightheaded when standing.   She states the last time she was vomited was 12 am.  She states she has been sipping on water.        Review of Systems   Constitutional:  Positive for fatigue. Negative for  activity change, chills and fever.   Respiratory: Negative.     Cardiovascular: Negative.    Gastrointestinal:  Positive for abdominal pain, diarrhea and vomiting.   Psychiatric/Behavioral: Negative.         Objective   Temp 98.6 °F (37 °C)     Physical Exam  Constitutional:       General: She is not in acute distress.     Appearance: Normal appearance. She is not ill-appearing or toxic-appearing.   HENT:      Head: Normocephalic and atraumatic.   Pulmonary:      Effort: Pulmonary effort is normal. No respiratory distress.   Abdominal:      Tenderness: There is abdominal tenderness.      Comments: Mild right upper quad abd pain   Neurological:      Mental Status: She is alert and oriented to person, place, and time.   Psychiatric:         Mood and Affect: Mood normal.         Behavior: Behavior normal.         Thought Content: Thought content normal.         Judgment: Judgment normal.         Visit Time  Total Visit Duration: 10 minutes not including the time spent for establishing the audio/video connection.

## 2025-05-29 ENCOUNTER — TELEPHONE (OUTPATIENT)
Dept: BARIATRICS | Facility: CLINIC | Age: 30
End: 2025-05-29

## 2025-06-05 ENCOUNTER — OFFICE VISIT (OUTPATIENT)
Dept: URGENT CARE | Facility: MEDICAL CENTER | Age: 30
End: 2025-06-05
Payer: COMMERCIAL

## 2025-06-05 VITALS
RESPIRATION RATE: 18 BRPM | TEMPERATURE: 83 F | SYSTOLIC BLOOD PRESSURE: 120 MMHG | OXYGEN SATURATION: 99 % | DIASTOLIC BLOOD PRESSURE: 76 MMHG | HEART RATE: 83 BPM

## 2025-06-05 DIAGNOSIS — L23.7 POISON IVY DERMATITIS: Primary | ICD-10-CM

## 2025-06-05 PROCEDURE — G0383 LEV 4 HOSP TYPE B ED VISIT: HCPCS | Performed by: PHYSICIAN ASSISTANT

## 2025-06-05 RX ORDER — PREDNISONE 10 MG/1
10 TABLET ORAL 2 TIMES DAILY WITH MEALS
Qty: 34 TABLET | Refills: 0 | Status: SHIPPED | OUTPATIENT
Start: 2025-06-05 | End: 2025-06-15

## 2025-06-05 RX ORDER — PROPRANOLOL HYDROCHLORIDE 10 MG/1
1 TABLET ORAL 2 TIMES DAILY
COMMUNITY
Start: 2025-05-26

## 2025-06-05 NOTE — PROGRESS NOTES
[facility]        NAME: Karoline Daigle is a 30 y.o. female  : 1995    MRN: [id number]  DATE: 2025  TIME: 1:53 PM    Assessment and Plan   Poison ivy dermatitis [L23.7]  1. Poison ivy dermatitis  predniSONE 10 mg tablet            Patient Instructions     Keep skin clean and dry  Use Zyrtec or Benadryl as needed for itching  Take Prednisone as per taper schedule: 3 tablets twice daily x 3 days, then 2 tablets twice daily x 3 days, then 1 daily x 4 days.   Follow-up with PCP if symptoms worsen or persist.       If tests have been performed at [facility], our office will contact you with results if changes need to be made to the care plan discussed with you at the visit.  You can review your full results on [facility].    Chief Complaint     Chief Complaint   Patient presents with    Rash     Rash started on . Describes as itchy.   Using lidocaine spray, taking zytrec.          History of Present Illness       Karoline is a 30-year-old female who presents with an itchy rash on both forearms and lower legs that developed over the past 3 days.  Patient denies the use of any new lotions, soaps or detergents.  She was possibly exposed to poison ivy.  Patient's been using topical lidocaine spray with no improvement of symptoms    Rash        Review of Systems   Review of Systems   Constitutional: Negative.    HENT: Negative.     Respiratory: Negative.     Skin:  Positive for rash.         Current Medications     Current Medications[1]    Current Allergies     Allergies as of 2025 - Reviewed 2025   Allergen Reaction Noted    Corylus Other (See Comments) 2021            The following portions of the patient's history were reviewed and updated as appropriate: allergies, current medications, past family history, past medical history, past social history, past surgical history and problem list.     Past Medical History[2]    Past Surgical History[3]    Family  History[4]      Medications have been verified.        Objective   /76   Pulse 83   Temp (!) 83 °F (28.3 °C)   Resp 18   SpO2 99%   No LMP recorded.       Physical Exam     Physical Exam  Constitutional:       General: She is not in acute distress.     Appearance: Normal appearance. She is not ill-appearing.   HENT:      Head: Normocephalic and atraumatic.      Right Ear: Tympanic membrane and ear canal normal.      Left Ear: Tympanic membrane and ear canal normal.      Nose: Nose normal.      Mouth/Throat:      Lips: Pink.      Pharynx: Oropharynx is clear.     Cardiovascular:      Rate and Rhythm: Normal rate and regular rhythm.      Heart sounds: Normal heart sounds, S1 normal and S2 normal. No murmur heard.  Pulmonary:      Effort: Pulmonary effort is normal.      Breath sounds: Normal breath sounds and air entry.     Skin:     Comments: Diffuse, raised, erythematous patches noted on the medial aspects of both forearms and lower legs.  No vesicle, bulla or hives.     Neurological:      Mental Status: She is alert.                        [1]   Current Outpatient Medications:     predniSONE 10 mg tablet, Take 1 tablet (10 mg total) by mouth 2 (two) times a day with meals for 10 days 3 PO BID x 3 days, then 2 PO BID x 3 days, then 1 daily x 4 days, Disp: 34 tablet, Rfl: 0    propranolol (INDERAL) 10 mg tablet, Take 1 tablet by mouth in the morning and 1 tablet before bedtime., Disp: , Rfl:     venlafaxine (EFFEXOR-XR) 150 mg 24 hr capsule, Take 150 mg by mouth in the morning., Disp: , Rfl: 5    clonazePAM (KlonoPIN) 0.5 mg tablet, Take 0.5 mg by mouth 2 (two) times a day as needed for seizures (Patient not taking: Reported on 6/5/2025), Disp: , Rfl:     drospirenone-ethinyl estradiol (ROGELIO) 3-0.03 MG per tablet, Take 1 tablet by mouth daily, Disp: , Rfl:     omeprazole (PriLOSEC) 40 MG capsule, Take 1 capsule by mouth daily (Patient not taking: Reported on 6/5/2025), Disp: , Rfl:     ondansetron  (ZOFRAN) 4 mg tablet, Take 1 tablet (4 mg total) by mouth every 8 (eight) hours as needed for nausea or vomiting for up to 2 days, Disp: 6 tablet, Rfl: 0  [2] No past medical history on file.  [3]   Past Surgical History:  Procedure Laterality Date    CHOLECYSTECTOMY LAPAROSCOPIC N/A 6/20/2021    Procedure: CHOLECYSTECTOMY LAPAROSCOPIC;  Surgeon: Quinn Fernandez DO;  [facility];  Service: General    FOOT TENDON SURGERY Bilateral 2012    Repair of flat feet   [4]   Family History  Problem Relation Name Age of Onset    No Known Problems Mother      No Known Problems Father

## 2025-06-05 NOTE — PATIENT INSTRUCTIONS
Keep skin clean and dry  Use Zyrtec or Benadryl as needed for itching  Take Prednisone as per taper schedule: 3 tablets twice daily x 3 days, then 2 tablets twice daily x 3 days, then 1 daily x 4 days.   Follow-up with PCP if symptoms worsen or persist.

## (undated) DEVICE — SUT MONOCRYL 4-0 PS-2 27 IN Y426H

## (undated) DEVICE — DRAPE EQUIPMENT RF WAND

## (undated) DEVICE — LIGAMAX 5 MM ENDOSCOPIC MULTIPLE CLIP APPLIER: Brand: LIGAMAX

## (undated) DEVICE — INTENDED FOR TISSUE SEPARATION, AND OTHER PROCEDURES THAT REQUIRE A SHARP SURGICAL BLADE TO PUNCTURE OR CUT.: Brand: BARD-PARKER SAFETY BLADES SIZE 11, STERILE

## (undated) DEVICE — CHLORAPREP HI-LITE 26ML ORANGE

## (undated) DEVICE — TISSUE RETRIEVAL SYSTEM: Brand: INZII RETRIEVAL SYSTEM

## (undated) DEVICE — TROCAR: Brand: KII FIOS FIRST ENTRY

## (undated) DEVICE — GLOVE SRG BIOGEL ORTHOPEDIC 8

## (undated) DEVICE — NEEDLE 22 G X 1 1/2 SAFETY

## (undated) DEVICE — VIAL DECANTER

## (undated) DEVICE — ALLENTOWN LAP CHOLE APP PACK: Brand: CARDINAL HEALTH

## (undated) DEVICE — COTTON TIP APPLICTOR 2 PK

## (undated) DEVICE — TUBING SMOKE EVAC W/FILTRATION DEVICE PLUMEPORT ACTIV

## (undated) DEVICE — LIGHT HANDLE COVER SLEEVE DISP BLUE STELLAR

## (undated) DEVICE — SUT VICRYL 0 UR-6 27 IN J603H

## (undated) DEVICE — TROCAR: Brand: KII® SLEEVE

## (undated) DEVICE — ADHESIVE SKIN HIGH VISCOSITY EXOFIN 1ML

## (undated) DEVICE — HARMONIC 1100 SHEARS, 36CM SHAFT LENGTH: Brand: HARMONIC

## (undated) DEVICE — INSUFFLATION TUBING PRIMFLO